# Patient Record
Sex: FEMALE | Race: WHITE | NOT HISPANIC OR LATINO | Employment: FULL TIME | URBAN - METROPOLITAN AREA
[De-identification: names, ages, dates, MRNs, and addresses within clinical notes are randomized per-mention and may not be internally consistent; named-entity substitution may affect disease eponyms.]

---

## 2017-03-10 ENCOUNTER — ALLSCRIPTS OFFICE VISIT (OUTPATIENT)
Dept: OTHER | Facility: OTHER | Age: 22
End: 2017-03-10

## 2017-04-28 ENCOUNTER — ALLSCRIPTS OFFICE VISIT (OUTPATIENT)
Dept: OTHER | Facility: OTHER | Age: 22
End: 2017-04-28

## 2017-08-14 ENCOUNTER — ALLSCRIPTS OFFICE VISIT (OUTPATIENT)
Dept: OTHER | Facility: OTHER | Age: 22
End: 2017-08-14

## 2017-10-31 ENCOUNTER — ALLSCRIPTS OFFICE VISIT (OUTPATIENT)
Dept: OTHER | Facility: OTHER | Age: 22
End: 2017-10-31

## 2017-10-31 LAB
BILIRUB UR QL STRIP: NORMAL
CLARITY UR: NORMAL
COLOR UR: YELLOW
GLUCOSE (HISTORICAL): NORMAL
HGB UR QL STRIP.AUTO: NORMAL
KETONES UR STRIP-MCNC: NORMAL MG/DL
LEUKOCYTE ESTERASE UR QL STRIP: 25
NITRITE UR QL STRIP: NORMAL
PH UR STRIP.AUTO: 5 [PH]
PROT UR STRIP-MCNC: NORMAL MG/DL
SP GR UR STRIP.AUTO: 1.01
UROBILINOGEN UR QL STRIP.AUTO: NORMAL

## 2017-11-02 ENCOUNTER — GENERIC CONVERSION - ENCOUNTER (OUTPATIENT)
Dept: OTHER | Facility: OTHER | Age: 22
End: 2017-11-02

## 2017-11-02 LAB
CULTURE RESULT (HISTORICAL): NORMAL
MISCELLANEOUS LAB TEST RESULT (HISTORICAL): NORMAL

## 2018-01-10 NOTE — RESULT NOTES
Discussion/Summary   Your urine culture is normal  Carol     Verified Results  (1) URINE CULTURE [id number] 12:00AM [place]     Test Name Result Flag Reference   Urine Culture,Comprehensive Final report     Result 1 Comment     No growth in 36 - 48 hours

## 2018-01-13 VITALS
BODY MASS INDEX: 24.73 KG/M2 | TEMPERATURE: 98.5 F | SYSTOLIC BLOOD PRESSURE: 110 MMHG | HEART RATE: 80 BPM | HEIGHT: 69 IN | DIASTOLIC BLOOD PRESSURE: 70 MMHG | WEIGHT: 167 LBS | RESPIRATION RATE: 20 BRPM

## 2018-01-15 NOTE — MISCELLANEOUS
Message  Return to work or school:   Veronica Kam is under my professional care  She was seen in my office on 3/10/17  Excuse on 3/10/17 and 3/11/17 if needed               Signatures   Electronically signed by : Riki Juarez DO; Mar 11 2017 12:03AM EST                       (Author)

## 2018-01-16 NOTE — MISCELLANEOUS
Provider Comments  Provider Comments:   Pt was a no show for a [address], called and left voicemail on cell phone        Signatures   Electronically signed by : Riki Juarez DO; Aug 14 2017 11:18PM EST                       (Author)

## 2018-01-18 NOTE — PROGRESS NOTES
Assessment    1  Acute bacterial conjunctivitis of left eye (372 03) (H10 32)   2  Asthma, mild intermittent (493 90) (J45 20)   3  Depression (311) (F32 9)   4  Tobacco use (305 1) (Z72 0)    Plan  Acute bacterial conjunctivitis of left eye    · Vigamox 0 5 % Ophthalmic Solution; INSTILL 1 DROP INTO AFFECTED EYE(S) 3  TIMES DAILY  SocHx: Tobacco use    · Decreasing the stress in your life may help your condition improve ; Status:Complete;    Done: 26OPQ4608   · Shared Decision Making Aid given; Status:Complete;   Done: 91SNK7140   · You need to quit smoking ; Status:Complete;   Done: 77OWX8072    Discussion/Summary  The patient was counseled regarding risk factor reductions, impressions, risks and benefits of treatment options  Provider Comments  Provider Comments:   asthma stable  mood stable  tob use unchanged  ophtho if no better  fluoroscein staining done, no ulceration or abrasion seen      Chief Complaint  pt c/o left red, swollen, irritated eye for a couple days  ac/cma      History of Present Illness  HPI: 1/4 ppd  depression stable, no seizure hx, still smoking  asthma ok, occas use, and illness  working, [employer] jobs  fdlmp ? no missed period    left eye  was in [place]  no fb exposure  red  irritated  this am crusty and swollen  light sensitive  wears contacts  vision ok      Review of Systems    Cardiovascular: no chest pain  Respiratory: no shortness of breath  Neurological: no dizziness  Active Problems    1  Anxiety (300 00) (F41 9)   2  Asthma, mild intermittent (493 90) (J45 20)   3  BMI 25 0-25 9,adult (V85 21) (Z68 25)   4  Depression (311) (F32 9)   5  Gynecologic Services Contraceptive Management (V25 9)   6  Lumbago (724 2) (M54 5)    Past Medical History    1  History of Accidental Puncture / Laceration During A Procedure (998 2)   2  Acute upper respiratory infection (465 9) (J06 9)   3  History of Acute upper respiratory infection (465 9) (J06 9)   4   History of Acute URI (465 9) (J06 9)   5  History of Acute UTI (599 0) (N39 0)   6  History of Encounter for health supervision or care of other healthy infant or child (V20 1)   (Z76 2)   7  History of Hearing Loss (389 9)   8  History of acute bronchitis (V12 69) (Z87 09)   9  History of acute sinusitis (V12 69) (Z87 09)   10  History of nausea (V12 79) (Z87 898)   11  History of Otitis media, unspecified laterality   12  History of UTI (lower urinary tract infection) (599 0) (N39 0)    Family History  Mother    1  Family history of Anxiety  Father    2  Family history of diabetes mellitus (V18 0) (Z83 3)  Family History Reviewed: The family history was reviewed and updated today  Social History    · Current every day smoker (305 1) (F17 200)  The social history was reviewed and updated today  Surgical History    1  Gynecologic Services Contraceptive Management (V25 9)    Current Meds   1  BuPROPion HCl ER (SR) 100 MG Oral Tablet Extended Release 12 Hour; Take 1 tablet   twice daily; Therapy: 84XMZ1115 to (Evaluate:22Apr2017)  Requested for: 75ZKH1625; Last   Rx:24Oct2016 Ordered   2  ProAir  (90 Base) MCG/ACT Inhalation Aerosol Solution; INHALE 1 PUFF   EVERY 4 HOURS AS NEEDED FOR COUGH AND WHEEZING; Therapy: 92VKT8843 to (Evaluate:23Wni3923)  Requested for: 24Oct2016; Last   Rx:08Ruz6278 Ordered    Allergies    1  Penicillins    Vitals   Recorded: 31WLJ3848 10:42AM   Temperature 97 4 F   Heart Rate 76   Respiration 16   Systolic 331   Diastolic 72   Height 5 ft 9 in   Weight 169 lb    BMI Calculated 24 96   BSA Calculated 1 92     Physical Exam    Constitutional   General appearance: No acute distress, well appearing and well nourished  Eyes   Conjunctiva and lids: Abnormal   Conjunctiva Findings: purulent discharge bilaterally, right hyperemia and left hyperemia, but no watery discharge  Pupils and irises: Equal, round and reactive to light      Ears, Nose, Mouth, and Throat   External inspection of ears and nose: Normal     Otoscopic examination: Tympanic membranes translucent with normal light reflex  Canals patent without erythema  Nasal mucosa, septum, and turbinates: Normal without edema or erythema  Oropharynx: Normal with no erythema, edema, exudate or lesions  Pulmonary   Respiratory effort: No increased work of breathing or signs of respiratory distress  Auscultation of lungs: Clear to auscultation  Cardiovascular   Auscultation of heart: Normal rate and rhythm, normal S1 and S2, without murmurs  Examination of extremities for edema and/or varicosities: Normal     Carotid pulses: Normal     Abdomen   Abdomen: Non-tender, no masses  Lymphatic   Palpation of lymph nodes in neck: No lymphadenopathy  Musculoskeletal   Gait and station: Normal     Digits and nails: Normal without clubbing or cyanosis  Skin   Skin and subcutaneous tissue: Normal without rashes or lesions  Psychiatric   Mood and affect: Normal          Message  Return to work or school:   Caryle Binder is under my professional care  She was seen in my office on 3/10/17  Excuse on 3/10/17 and 3/11/17 if needed               Signatures   Electronically signed by : Farooq Gary DO; Mar 11 2017 12:03AM EST                       (Author)

## 2018-01-22 VITALS
BODY MASS INDEX: 25.03 KG/M2 | SYSTOLIC BLOOD PRESSURE: 110 MMHG | WEIGHT: 169 LBS | RESPIRATION RATE: 16 BRPM | HEIGHT: 69 IN | TEMPERATURE: 97.4 F | HEART RATE: 76 BPM | DIASTOLIC BLOOD PRESSURE: 72 MMHG

## 2018-01-22 VITALS
HEIGHT: 69 IN | DIASTOLIC BLOOD PRESSURE: 80 MMHG | WEIGHT: 170 LBS | TEMPERATURE: 97.9 F | SYSTOLIC BLOOD PRESSURE: 102 MMHG | HEART RATE: 76 BPM | RESPIRATION RATE: 16 BRPM | BODY MASS INDEX: 25.18 KG/M2

## 2018-04-24 ENCOUNTER — OFFICE VISIT (OUTPATIENT)
Dept: FAMILY MEDICINE CLINIC | Facility: CLINIC | Age: 23
End: 2018-04-24
Payer: COMMERCIAL

## 2018-04-24 VITALS
BODY MASS INDEX: 24.62 KG/M2 | DIASTOLIC BLOOD PRESSURE: 74 MMHG | SYSTOLIC BLOOD PRESSURE: 126 MMHG | HEIGHT: 70 IN | TEMPERATURE: 97.5 F | HEART RATE: 72 BPM | WEIGHT: 172 LBS | RESPIRATION RATE: 16 BRPM

## 2018-04-24 DIAGNOSIS — G89.29 CHRONIC BILATERAL LOW BACK PAIN WITHOUT SCIATICA: Primary | ICD-10-CM

## 2018-04-24 DIAGNOSIS — M54.50 CHRONIC BILATERAL LOW BACK PAIN WITHOUT SCIATICA: Primary | ICD-10-CM

## 2018-04-24 DIAGNOSIS — M54.2 CERVICALGIA: ICD-10-CM

## 2018-04-24 PROBLEM — F41.8 DEPRESSION WITH ANXIETY: Status: ACTIVE | Noted: 2017-04-28

## 2018-04-24 PROCEDURE — 3008F BODY MASS INDEX DOCD: CPT | Performed by: FAMILY MEDICINE

## 2018-04-24 PROCEDURE — 99213 OFFICE O/P EST LOW 20 MIN: CPT | Performed by: FAMILY MEDICINE

## 2018-04-24 RX ORDER — NAPROXEN 500 MG/1
500 TABLET ORAL 2 TIMES DAILY PRN
Qty: 40 TABLET | Refills: 1 | Status: SHIPPED | OUTPATIENT
Start: 2018-04-24

## 2018-04-24 NOTE — PROGRESS NOTES
Assessment/Plan:    Problem List Items Addressed This Visit     Cervicalgia     Worsening neck pain         Relevant Medications    naproxen (EC NAPROSYN) 500 MG EC tablet    Other Relevant Orders    Ambulatory referral to Physical Therapy    Chronic bilateral low back pain without sciatica - Primary     Worsening back pain         Relevant Medications    naproxen (EC NAPROSYN) 500 MG EC tablet    Other Relevant Orders    Ambulatory referral to Physical Therapy      She will work on physical therapy while going back to work with restriction  Follow up with orthopedist if she doesn't improve  There are no Patient Instructions on file for this visit  Return if symptoms worsen or fail to improve  Subjective:      Patient ID: Enedelia Keenan is a 20 y o  female  Chief Complaint   Patient presents with    Pain     back pain--lj       She has had back pain for months and then it got worse a few days ago  She has been missing a lot of work  She has seen a chiropractor and had x-rays and treatment there  The manipulation has not helped  She has to do some lifting and walking at work  Her pain is interfering with her job  The pain in her neck will go down her right arm  The following portions of the patient's history were reviewed and updated as appropriate:  past social history    Review of Systems   Musculoskeletal: Positive for back pain and neck pain  Neurological: Negative for weakness  Current Outpatient Prescriptions   Medication Sig Dispense Refill    naproxen (EC NAPROSYN) 500 MG EC tablet Take 1 tablet (500 mg total) by mouth 2 (two) times a day as needed for mild pain Take with food 40 tablet 1     No current facility-administered medications for this visit          Objective:    /74   Pulse 72   Temp 97 5 °F (36 4 °C)   Resp 16   Ht 5' 9 5" (1 765 m)   Wt 78 kg (172 lb)   LMP 04/15/2018   BMI 25 04 kg/m²        Physical Exam   Constitutional: She appears well-developed and well-nourished  HENT:   Head: Normocephalic and atraumatic  Right Ear: External ear normal    Left Ear: External ear normal    Nose: Nose normal    Mouth/Throat: Oropharynx is clear and moist    Cardiovascular: Normal rate, regular rhythm and normal heart sounds  Pulmonary/Chest: Effort normal and breath sounds normal  No respiratory distress  She has no wheezes  Musculoskeletal:   Bilateral cervical paraspinal tenderness, right lumbar tenderness with positive straight leg raising on the right  Stiff gait   Neurological: She has normal reflexes  Nursing note and vitals reviewed               Jodie Blackwell DO

## 2018-07-16 ENCOUNTER — TELEPHONE (OUTPATIENT)
Dept: FAMILY MEDICINE CLINIC | Facility: CLINIC | Age: 23
End: 2018-07-16

## 2019-10-14 ENCOUNTER — TELEPHONE (OUTPATIENT)
Dept: OBGYN CLINIC | Facility: CLINIC | Age: 24
End: 2019-10-14

## 2019-10-15 ENCOUNTER — OFFICE VISIT (OUTPATIENT)
Dept: FAMILY MEDICINE CLINIC | Facility: CLINIC | Age: 24
End: 2019-10-15
Payer: COMMERCIAL

## 2019-10-15 VITALS
OXYGEN SATURATION: 100 % | HEART RATE: 69 BPM | TEMPERATURE: 97.2 F | BODY MASS INDEX: 22.19 KG/M2 | SYSTOLIC BLOOD PRESSURE: 110 MMHG | DIASTOLIC BLOOD PRESSURE: 68 MMHG | HEIGHT: 70 IN | WEIGHT: 155 LBS | RESPIRATION RATE: 18 BRPM

## 2019-10-15 DIAGNOSIS — R10.2 PELVIC PAIN: ICD-10-CM

## 2019-10-15 DIAGNOSIS — L70.9 ACNE, UNSPECIFIED ACNE TYPE: ICD-10-CM

## 2019-10-15 DIAGNOSIS — N92.6 IRREGULAR MENSTRUAL BLEEDING: Primary | ICD-10-CM

## 2019-10-15 PROCEDURE — 99213 OFFICE O/P EST LOW 20 MIN: CPT | Performed by: FAMILY MEDICINE

## 2019-10-15 NOTE — PROGRESS NOTES
Assessment/Plan:     Diagnoses and all orders for this visit:    Irregular menstrual bleeding  Pelvic Pain  Acne  -     Ambulatory referral to Obstetrics / Gynecology; Future  - Unclear etiology at this time  Did discuss birth control with patient, but she does not want OCPs or any hormonal birth control due to side effects and the fact that she is a smoker  Discussed the option of an IUD which she seemed interested in    - Pt is due for pap smear which she states she will get at GYN office    - Discussed to continue with OTC benzoyl peroxide washes for facial acne  If symptoms worsen, we can try alternative options including topical antibiotics  BMI Counseling: Body mass index is 22 24 kg/m²  Discussed with patient's BMI with her  The BMI is normal      Subjective:      Patient ID: Donna Lin is a 30 y o  female  HPI  Celia Morrison is a 29 yo F who presents today with c/o irregular menstrual bleeding, pelvic pain and acne which began in June of 2019  Pt states she had similar issues in the past (had an eating disorder in her teenage years causing her to have no period, followed by irregular bleeding requiring OCPs)  States that OCPs at the time made her feel emotionally horrible and would not like to go back on them  Pt states in June, when symptoms began, she had bleeding in between her regular cycle which was dark in color  The following month she had no period at all  Then this past month she experienced bleeding in between her cycle again  States that the bleeding is different from her usual period bleeding  It is more dark brown-grey in color  She has associated pelvic pain before and during the cycle  The bleeding is not heavier than usual  She is currently having bleeding  Menarche was at age 13  She is sexually active  She is an every day smoker  States that she has also had acne in the past, but has noted it to be worse than usual now    Denies abnormal vaginal discharge, foul odor, urinary symptoms  The following portions of the patient's history were reviewed and updated as appropriate: allergies, current medications, past family history, past medical history, past social history, past surgical history and problem list     Review of Systems   Constitutional: Negative for activity change, appetite change, chills, diaphoresis, fatigue and fever  HENT: Negative  Respiratory: Negative for cough, choking, chest tightness, shortness of breath and wheezing  Cardiovascular: Negative for chest pain, palpitations and leg swelling  Gastrointestinal: Negative for abdominal distention, abdominal pain, constipation, diarrhea, nausea and vomiting  Genitourinary: Positive for menstrual problem, pelvic pain and vaginal bleeding  Negative for difficulty urinating, dyspareunia, dysuria, enuresis, flank pain, frequency and urgency  Musculoskeletal: Negative for arthralgias, back pain, gait problem, joint swelling, myalgias, neck pain and neck stiffness  Skin: Negative  Neurological: Negative for dizziness, tremors, syncope, facial asymmetry, weakness, light-headedness, numbness and headaches  Psychiatric/Behavioral: Negative  Objective:      /68   Pulse 69   Temp (!) 97 2 °F (36 2 °C)   Resp 18   Ht 5' 10" (1 778 m)   Wt 70 3 kg (155 lb)   LMP 09/25/2019 (Approximate)   SpO2 100%   BMI 22 24 kg/m²          Physical Exam   Constitutional: She is oriented to person, place, and time  She appears well-developed and well-nourished  Non-toxic appearance  She does not appear ill  No distress  HENT:   Head: Normocephalic and atraumatic  Cardiovascular: Normal rate, regular rhythm, normal heart sounds and intact distal pulses  Exam reveals no gallop and no friction rub  No murmur heard  Pulmonary/Chest: Effort normal and breath sounds normal  No stridor  No respiratory distress  She has no wheezes  She has no rales  She exhibits no tenderness  Abdominal: Soft   Bowel sounds are normal  She exhibits no distension and no mass  There is no tenderness  There is no rebound and no guarding  Neurological: She is alert and oriented to person, place, and time  Skin: She is not diaphoretic

## 2019-10-24 ENCOUNTER — OFFICE VISIT (OUTPATIENT)
Dept: OBGYN CLINIC | Facility: CLINIC | Age: 24
End: 2019-10-24
Payer: COMMERCIAL

## 2019-10-24 VITALS
WEIGHT: 159 LBS | DIASTOLIC BLOOD PRESSURE: 52 MMHG | BODY MASS INDEX: 22.76 KG/M2 | HEIGHT: 70 IN | SYSTOLIC BLOOD PRESSURE: 96 MMHG

## 2019-10-24 DIAGNOSIS — R30.0 DYSURIA: ICD-10-CM

## 2019-10-24 DIAGNOSIS — N92.6 IRREGULAR MENSTRUAL BLEEDING: Primary | ICD-10-CM

## 2019-10-24 DIAGNOSIS — N94.6 DYSMENORRHEA: ICD-10-CM

## 2019-10-24 PROCEDURE — 99212 OFFICE O/P EST SF 10 MIN: CPT | Performed by: NURSE PRACTITIONER

## 2019-10-24 RX ORDER — NORETHINDRONE ACETATE AND ETHINYL ESTRADIOL 1MG-20(21)
1 KIT ORAL DAILY
Qty: 84 TABLET | Refills: 0 | Status: SHIPPED | OUTPATIENT
Start: 2019-10-24

## 2019-10-24 NOTE — ASSESSMENT & PLAN NOTE
Urine dip in office WNL  Will send out urine culture per patient request   Pt will continue to monitor symptoms and call if worsening  Increase hydration  Drink cranberry juice    Will call with culture results and follow up accordingly

## 2019-10-24 NOTE — PROGRESS NOTES
Assessment/Plan:    Irregular menstrual bleeding  Discussed irregular menses and dysmenorrhea  Rx for TFT's  Options such as 600mg Motrin Q 6 hours vs hormonal contraceptives discussed  Pt is hesitant to try OCPs as had bad mood swings on OCPs in past  Discussed there are many different type of hormonal contraceptives  Reviewed birth control options including OCP, NuvaRing, Patch, Depo provera, Nexplanon  Reviewed when to start  What to do if misses pill  Recommended condom use for STD protection and back up method for at least first month after starting pill or if misses more than 2 pills in the pack  Reviewed common side effects of pill including N/V, HA, Breast Pain, Weight gain, bloating, mood swings  Reassured side effects diminished after first month or two on the pill  Reviewed clotting risk and S/S of PE, DVT, MI, and stroke  Rx for Junel 1/20 Fe sent to pharmacy  RTO 3 months for pill check and annual exam or sooner as needed      Dysuria  Urine dip in office WNL  Will send out urine culture per patient request   Pt will continue to monitor symptoms and call if worsening  Increase hydration  Drink cranberry juice  Will call with culture results and follow up accordingly       Diagnoses and all orders for this visit:    Irregular menstrual bleeding  -     norethindrone-ethinyl estradiol (JUNEL FE 1/20) 1-20 MG-MCG per tablet; Take 1 tablet by mouth daily  -     Ambulatory referral to Obstetrics / Gynecology  -     T4, free  -     TSH, 3rd generation    Dysmenorrhea  -     norethindrone-ethinyl estradiol (JUNEL FE 1/20) 1-20 MG-MCG per tablet; Take 1 tablet by mouth daily    Dysuria          Subjective:      Patient ID: Peet Blake is a 30 y o  female  Pt presents as a new patient to our office with complaints of pelvic pain and irregular menses  Menarche at age 13  Menses are monthly lasting 7-10 days  Moderate flow  Menses are not too painful    Does have some bloating and breast tenderness prior to onset of menses  She also will notice some mild acne and mood swings  Denies any pain with intercourse  Denies any pain outside of menses  In June and September she had bleeding in between her menses  Bleeding was lighter and painful  Dark brown spotting  Last pap smear: 2017 Normal per patient  Denies any abnormal vaginal discharge, itching, or odor  Denies the need for STD testing  Was previously on OCP's, last use was 7 years ago  Pt also complaining of urinary symptoms  Dysuria, frequency, urgency, hesitancy which began earlier this week  She gets UTI's quite frequently    + back pain  Denies any fever or chills  No hematuria  The following portions of the patient's history were reviewed and updated as appropriate: allergies, current medications, past family history, past medical history, past social history, past surgical history and problem list     Review of Systems   All other systems reviewed and are negative  Objective:  BP 96/52 (BP Location: Right arm, Patient Position: Sitting, Cuff Size: Standard)   Ht 5' 10" (1 778 m)   Wt 72 1 kg (159 lb)   LMP 09/25/2019 (Approximate)   BMI 22 81 kg/m²      Physical Exam   Constitutional: She is oriented to person, place, and time  She appears well-developed and well-nourished  HENT:   Head: Normocephalic and atraumatic  Neck: Normal range of motion  Neck supple  No thyromegaly present  Cardiovascular: Normal rate, regular rhythm and normal heart sounds  Pulmonary/Chest: Effort normal and breath sounds normal    Abdominal: Soft  Bowel sounds are normal  She exhibits no distension and no mass  There is no tenderness  There is no rebound and no guarding  Genitourinary: Vagina normal and uterus normal  No labial fusion  There is no rash, tenderness, lesion or injury on the right labia  There is no rash, tenderness, lesion or injury on the left labia   Cervix exhibits no motion tenderness, no discharge and no friability  Right adnexum displays no mass, no tenderness and no fullness  Left adnexum displays no mass, no tenderness and no fullness  Musculoskeletal: Normal range of motion  Neurological: She is alert and oriented to person, place, and time  Skin: Skin is warm and dry  Psychiatric: She has a normal mood and affect   Her behavior is normal  Judgment and thought content normal

## 2019-10-24 NOTE — ASSESSMENT & PLAN NOTE
Discussed irregular menses and dysmenorrhea  Rx for TFT's  Options such as 600mg Motrin Q 6 hours vs hormonal contraceptives discussed  Pt is hesitant to try OCPs as had bad mood swings on OCPs in past  Discussed there are many different type of hormonal contraceptives  Reviewed birth control options including OCP, NuvaRing, Patch, Depo provera, Nexplanon  Reviewed when to start  What to do if misses pill  Recommended condom use for STD protection and back up method for at least first month after starting pill or if misses more than 2 pills in the pack  Reviewed common side effects of pill including N/V, HA, Breast Pain, Weight gain, bloating, mood swings  Reassured side effects diminished after first month or two on the pill  Reviewed clotting risk and S/S of PE, DVT, MI, and stroke    Rx for Junel 1/20 Fe sent to pharmacy  RTO 3 months for pill check and annual exam or sooner as needed

## 2019-11-14 ENCOUNTER — OFFICE VISIT (OUTPATIENT)
Dept: FAMILY MEDICINE CLINIC | Facility: CLINIC | Age: 24
End: 2019-11-14
Payer: COMMERCIAL

## 2019-11-14 VITALS
SYSTOLIC BLOOD PRESSURE: 108 MMHG | OXYGEN SATURATION: 99 % | HEIGHT: 70 IN | BODY MASS INDEX: 21.88 KG/M2 | DIASTOLIC BLOOD PRESSURE: 68 MMHG | HEART RATE: 95 BPM | TEMPERATURE: 101.1 F | WEIGHT: 152.8 LBS | RESPIRATION RATE: 18 BRPM
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
## 2019-11-14 DIAGNOSIS — R68.89 FLU-LIKE SYMPTOMS: Primary | ICD-10-CM

## 2019-11-14 PROCEDURE — 99213 OFFICE O/P EST LOW 20 MIN: CPT | Performed by: NURSE PRACTITIONER

## 2019-11-14 RX ORDER — DOXYCYCLINE HYCLATE 100 MG
100 TABLET ORAL 2 TIMES DAILY
Qty: 28 TABLET | Refills: 0 | Status: SHIPPED | OUTPATIENT
Start: 2019-11-14 | End: 2019-11-28

## 2019-11-14 NOTE — PATIENT INSTRUCTIONS
Take medication with food  It is important that you take the entire course of antibiotics prescribed  May also take a probiotic of your choice to maintain healthy GI joseline  Can take some probiotic and yogurt with the medication  Increase fluid intake, saline nasal rinses, and hot tea with honey and lemon  Cool air humidification can be helpful as well  May take Ibuprofen or Tylenol as needed for pain or fevers  Mucinex D for sinus congestion or Coricidin HBP if you have high blood pressure or a heart condition  Mucinex or Robitussin DM are effective for cough and chest congestion  Supportive care discussed and advised  Advised to RTO for any worsening and no improvement  Follow up for no improvement and worsening of conditions  Patient advised and educated when to see immediate medical care  The maximum daily dose of acetaminophen was discussed with the patient  She was encouraged not to exceed 4,000 mg of acetaminophen during a 24 hour period and was asked to keep in mind that acetaminophen can also be found in many over-the-counter cold medications as well as narcotics that may be given for pain  The patient expresses understanding of these issues and questions were answered

## 2019-11-14 NOTE — PROGRESS NOTES
Assessment/Plan:    1  Flu-like symptoms  -     Resp Virus Panel (RVP) PCR    2  Tick bite, initial encounter  -     doxycycline hyclate (VIBRA-TABS) 100 mg tablet; Take 1 tablet (100 mg total) by mouth 2 (two) times a day for 14 days          BMI Counseling: Body mass index is 21 92 kg/m²  Discussed the patient's BMI with her  Patient Instructions: Take medication with food  It is important that you take the entire course of antibiotics prescribed  May also take a probiotic of your choice to maintain healthy GI joseline  Can take some probiotic and yogurt with the medication  Increase fluid intake, saline nasal rinses, and hot tea with honey and lemon  Cool air humidification can be helpful as well  May take Ibuprofen or Tylenol as needed for pain or fevers  Mucinex D for sinus congestion or Coricidin HBP if you have high blood pressure or a heart condition  Mucinex or Robitussin DM are effective for cough and chest congestion  Supportive care discussed and advised  Advised to RTO for any worsening and no improvement  Follow up for no improvement and worsening of conditions  Patient advised and educated when to see immediate medical care  The maximum daily dose of acetaminophen was discussed with the patient  She was encouraged not to exceed 4,000 mg of acetaminophen during a 24 hour period and was asked to keep in mind that acetaminophen can also be found in many over-the-counter cold medications as well as narcotics that may be given for pain  The patient expresses understanding of these issues and questions were answered  Return if symptoms worsen or fail to improve  Future Appointments   Date Time Provider Vanessa Hernandez   1/24/2020  7:30 AM MARIE Gross [facility]           Subjective:      Patient ID: Nicolas Stoddard is a 30 y o  female      Chief Complaint   Patient presents with    Fever     for the past 3 days [id number]     Headache - Recurrent or Known Dx Migraines    Generalized Body Aches         Vitals:  /68   Pulse 95   Temp (!) 101 1 °F (38 4 °C)   Resp 18   Ht 5' 10" (1 778 m)   Wt 69 3 kg (152 lb 12 8 oz)   LMP 11/05/2019 (Exact Date)   SpO2 99%   BMI 21 92 kg/m²     HPI  Stated that started taking OCP last week and started having abdominal cramping and nausea and vomiting which is resolve now  Also had tick bite last week and on 11/8 and tick was embedded and she removed it and was deer  Has h/o lyme  Stated that started with bodyaches, headaches, and fever 2 days ago  Denies anybody sick around her  The following portions of the patient's history were reviewed and updated as appropriate: allergies, current medications, past family history, past medical history, past social history, past surgical history and problem list       Review of Systems   Constitutional: Positive for fatigue and fever  Negative for chills, diaphoresis and unexpected weight change  HENT: Negative for congestion, dental problem, drooling, ear discharge, ear pain, facial swelling, hearing loss, mouth sores, nosebleeds, postnasal drip, rhinorrhea, sinus pressure, sinus pain, sneezing, sore throat, tinnitus, trouble swallowing and voice change  Respiratory: Negative for cough, chest tightness, shortness of breath and wheezing  Cardiovascular: Negative  Gastrointestinal: Negative for abdominal pain, constipation, diarrhea, nausea and vomiting  Musculoskeletal: Positive for myalgias  Skin: Negative  Neurological: Positive for headaches  Negative for dizziness, weakness and light-headedness  Hematological: Negative  Objective:    Social History     Tobacco Use   Smoking Status Current Every Day Smoker   Smokeless Tobacco Current User       Allergies:    Allergies   Allergen Reactions    Penicillins Rash     Reaction Date: 01Jul2005;          Current Outpatient Medications   Medication Sig Dispense Refill    norethindrone-ethinyl estradiol (JUNEL FE 1/20) 1-20 MG-MCG per tablet Take 1 tablet by mouth daily 84 tablet 0    doxycycline hyclate (VIBRA-TABS) 100 mg tablet Take 1 tablet (100 mg total) by mouth 2 (two) times a day for 14 days 28 tablet 0    naproxen (EC NAPROSYN) 500 MG EC tablet Take 1 tablet (500 mg total) by mouth 2 (two) times a day as needed for mild pain Take with food (Patient not taking: Reported on 10/15/2019) 40 tablet 1     No current facility-administered medications for this visit  Physical Exam   Constitutional: She is oriented to person, place, and time  She appears well-developed and well-nourished  HENT:   Head: Normocephalic  Right Ear: Tympanic membrane, external ear and ear canal normal    Left Ear: Tympanic membrane, external ear and ear canal normal    Nose: Nose normal  Right sinus exhibits no maxillary sinus tenderness and no frontal sinus tenderness  Left sinus exhibits no maxillary sinus tenderness and no frontal sinus tenderness  Mouth/Throat: Oropharynx is clear and moist and mucous membranes are normal    Neck: Neck supple  Cardiovascular: Normal rate, regular rhythm and normal heart sounds  Pulmonary/Chest: Effort normal and breath sounds normal    Abdominal: Normal appearance and bowel sounds are normal  There is no hepatosplenomegaly  There is no tenderness  There is no rebound  Musculoskeletal: Normal range of motion  Lymphadenopathy:        Right cervical: No superficial cervical and no posterior cervical adenopathy present  Left cervical: No superficial cervical and no posterior cervical adenopathy present  Neurological: She is alert and oriented to person, place, and time  Skin: Skin is warm and dry  Psychiatric: She has a normal mood and affect  Her behavior is normal  Judgment and thought content normal    Vitals reviewed                    MARIE Washington

## 2019-11-17 LAB
FLUAV RNA SPEC QL NAA+PROBE: NEGATIVE
FLUBV RNA SPEC QL NAA+PROBE: NEGATIVE
HADV DNA SPEC QL NAA+PROBE: NEGATIVE
HMPV RNA SPEC QL NAA+PROBE: NEGATIVE
HPIV1 RNA SPEC QL NAA+PROBE: NEGATIVE
HPIV2 RNA SPEC QL NAA+PROBE: NEGATIVE
HPIV3 RNA SPEC QL NAA+PROBE: NEGATIVE
RHINOVIRUS RNA SPEC QL NAA+PROBE: NEGATIVE
RSV A RNA SPEC QL NAA+PROBE: NEGATIVE
RSV B RNA SPEC QL NAA+PROBE: NEGATIVE

## 2019-12-03 ENCOUNTER — OFFICE VISIT (OUTPATIENT)
Dept: FAMILY MEDICINE CLINIC | Facility: CLINIC | Age: 24
End: 2019-12-03
Payer: COMMERCIAL

## 2019-12-03 VITALS
HEIGHT: 70 IN | DIASTOLIC BLOOD PRESSURE: 60 MMHG | HEART RATE: 72 BPM | SYSTOLIC BLOOD PRESSURE: 100 MMHG | BODY MASS INDEX: 21.9 KG/M2 | TEMPERATURE: 98.5 F | RESPIRATION RATE: 16 BRPM | WEIGHT: 153 LBS

## 2019-12-03 DIAGNOSIS — B34.9 VIRAL ILLNESS: ICD-10-CM
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
## 2019-12-03 PROCEDURE — 3008F BODY MASS INDEX DOCD: CPT | Performed by: NURSE PRACTITIONER

## 2019-12-03 PROCEDURE — 99213 OFFICE O/P EST LOW 20 MIN: CPT | Performed by: NURSE PRACTITIONER

## 2019-12-03 NOTE — PROGRESS NOTES
Assessment/Plan:    1  Tick bite, initial encounter  -     Lyme Antibody Profile with reflex to WB; Future  -     Babesia microti antibody, IgG & Igm; Future  -     Comprehensive metabolic panel; Future  -     CBC and Platelet; Future    2  Viral illness  -     Comprehensive metabolic panel; Future  -     CBC and Platelet; Future          BMI Counseling: Body mass index is 21 95 kg/m²  Discussed the patient's BMI with her  Patient Instructions:  Supportive care discussed and advised  Advised to RTO for any worsening and no improvement  Follow up for no improvement and worsening of conditions  Patient advised and educated when to see immediate medical care  Return if symptoms worsen or fail to improve  Future Appointments   Date Time Provider Vanessa Hernandez   1/24/2020  7:30 AM MARIE Gross [facility]           Subjective:      Patient ID: Gopal Villanueva is a 30 y o  female  Chief Complaint   Patient presents with    Follow-up     lyme disease--lj         Vitals:  /60   Pulse 72   Temp 98 5 °F (36 9 °C)   Resp 16   Ht 5' 10" (1 778 m)   Wt 69 4 kg (153 lb)   LMP 11/05/2019 (Exact Date)   BMI 21 95 kg/m²     HPI  Patient was seen in office on 11/14/19 for flu like symptoms and had the tick bite and treated with doxy for 14 days which she finished  Stated that now again started with chills, sweats, body aches and fatigue  Getting light headed occasionally  Stated that wanted to get checked for lyme  Denies any fever, chills, sob and chest pain  The following portions of the patient's history were reviewed and updated as appropriate: allergies, current medications, past family history, past medical history, past social history, past surgical history and problem list       Review of Systems   Constitutional: Positive for chills and diaphoresis  Negative for fatigue, fever and unexpected weight change     HENT: Negative for congestion, dental problem, drooling, ear discharge, ear pain, facial swelling, hearing loss, mouth sores, nosebleeds, postnasal drip, rhinorrhea, sinus pressure, sinus pain, sneezing, sore throat, tinnitus, trouble swallowing and voice change  Respiratory: Negative for cough, chest tightness, shortness of breath and wheezing  Cardiovascular: Negative  Gastrointestinal: Negative for abdominal pain, constipation, diarrhea, nausea and vomiting  Musculoskeletal: Positive for myalgias  Skin: Negative  Neurological: Positive for light-headedness  Negative for dizziness, weakness and headaches  Hematological: Negative  Objective:    Social History     Tobacco Use   Smoking Status Current Every Day Smoker   Smokeless Tobacco Current User       Allergies: Allergies   Allergen Reactions    Penicillins Rash     Reaction Date: 01Jul2005;          Current Outpatient Medications   Medication Sig Dispense Refill    naproxen (EC NAPROSYN) 500 MG EC tablet Take 1 tablet (500 mg total) by mouth 2 (two) times a day as needed for mild pain Take with food 40 tablet 1    norethindrone-ethinyl estradiol (JUNEL FE 1/20) 1-20 MG-MCG per tablet Take 1 tablet by mouth daily 84 tablet 0     No current facility-administered medications for this visit  Physical Exam   Constitutional: She is oriented to person, place, and time  She appears well-developed and well-nourished  HENT:   Head: Normocephalic  Right Ear: Tympanic membrane, external ear and ear canal normal    Left Ear: Tympanic membrane, external ear and ear canal normal    Nose: Nose normal  Right sinus exhibits no maxillary sinus tenderness and no frontal sinus tenderness  Left sinus exhibits no maxillary sinus tenderness and no frontal sinus tenderness  Mouth/Throat: Oropharynx is clear and moist and mucous membranes are normal    Neck: Neck supple  Cardiovascular: Normal rate, regular rhythm and normal heart sounds     Pulmonary/Chest: Effort normal and breath sounds normal    Abdominal: Normal appearance and bowel sounds are normal  There is no hepatosplenomegaly  There is no tenderness  There is no rebound  Musculoskeletal: Normal range of motion  Lymphadenopathy:        Right cervical: No superficial cervical and no posterior cervical adenopathy present  Left cervical: No superficial cervical and no posterior cervical adenopathy present  Neurological: She is alert and oriented to person, place, and time  Skin: Skin is warm and dry  No rash noted  Psychiatric: She has a normal mood and affect  Her behavior is normal  Judgment and thought content normal    Vitals reviewed                    MARIE Hill

## 2019-12-03 NOTE — PATIENT INSTRUCTIONS
Viral Syndrome   AMBULATORY CARE:   Viral syndrome  is a term used for general symptoms of a viral infection that has no clear cause  Viruses are spread easily from person to person through the air and on shared items  Common symptoms include the following:   · Fever and chills    · A runny or stuffy nose     · Cough, sore throat, or hoarseness     · Headache, or pain and pressure around your eyes     · Muscle aches and joint pain     · Shortness of breath or wheezing     · Abdominal pain, cramps, and diarrhea     · Nausea, vomiting, or loss of appetite  Call 911 for the following:   · You have a seizure  · You cannot be woken  · You have chest pain or trouble breathing  Seek care immediately if:   · You have a stiff neck, a bad headache, and sensitivity to light  · You feel weak, dizzy, or confused  · You stop urinating or urinate a lot less than normal      · You cough up blood or thick, yellow or green, mucus  · You have severe abdominal pain or your abdomen is larger than usual   Contact your healthcare provider if:   · Your symptoms do not get better with treatment, or get worse, after 3 days  · You have a rash or ear pain  · You have burning when you urinate  · You have questions or concerns about your condition or care  Treatment for viral syndrome  may include medicines to manage your symptoms  You may  need any of the following:  · Acetaminophen  decreases pain and fever  It is available without a doctor's order  Ask how much medicine to take and how often to take it  Follow directions  Acetaminophen can cause liver damage if not taken correctly  · NSAIDs , such as ibuprofen, help decrease swelling, pain, and fever  NSAIDs can cause stomach bleeding or kidney problems in certain people  If you take blood thinner medicine, always ask your healthcare provider if NSAIDs are safe for you  Always read the medicine label and follow directions      · Cold medicine  helps decrease swelling, control a cough, and relieve chest or nasal congestion  · Saline nasal spray  helps decrease nasal congestion  · Take your medicine as directed  Contact your healthcare provider if you think your medicine is not helping or if you have side effects  Tell him of her if you are allergic to any medicine  Keep a list of the medicines, vitamins, and herbs you take  Include the amounts, and when and why you take them  Bring the list or the pill bottles to follow-up visits  Carry your medicine list with you in case of an emergency  Manage your symptoms:   · Drink liquids as directed  to prevent dehydration  Ask how much liquid to drink each day and which liquids are best for you  Ask if you should drink an oral rehydration solution (ORS)  An ORS has the right amounts of water, salts, and sugar you need to replace body fluids  This may help prevent dehydration caused by vomiting or diarrhea  Do not drink liquids with caffeine  Drinks with caffeine can make dehydration worse  · Get plenty of rest  to help your body heal  Take naps throughout the day  Ask your healthcare provider when you can return to work and your normal activities  · Use a cool mist humidifier  to help you breathe easier if you have nasal or chest congestion  Ask your healthcare provider how to use a cool mist humidifier  · Eat honey or use cough drops  to help decrease throat discomfort  Ask your healthcare provider how much honey you should eat each day  Cough drops are available without a doctor's order  Follow directions for taking cough drops  · Do not smoke and stay away from others who smoke  Nicotine and other chemicals in cigarettes and cigars can cause lung damage  Smoking can also delay healing  Ask your healthcare provider for information if you currently smoke and need help to quit  E-cigarettes or smokeless tobacco still contain nicotine   Talk to your healthcare provider before you use these products  · Wash your hands frequently  to prevent the spread of germs to others  Use soap and water  Use gel hand  when soap and water are not available  Wash your hands after you use the bathroom, cough, or sneeze  Wash your hands before you prepare or eat food  Follow up with your healthcare provider as directed:  Write down your questions so you remember to ask them during your visits  © 2017 [facility] Information is for End User's use only and may not be sold, redistributed or otherwise used for commercial purposes  All illustrations and images included in CareNotes® are the copyrighted property of A D A M [employer] Inc  or [employer]  The above information is an  only  It is not intended as medical advice for individual conditions or treatments  Talk to your doctor, nurse or pharmacist before following any medical regimen to see if it is safe and effective for you

## 2019-12-24 LAB
ALBUMIN SERPL-MCNC: 5.2 G/DL (ref 3.5–5.5)
ALBUMIN/GLOB SERPL: 2.2 {RATIO} (ref 1.2–2.2)
ALP SERPL-CCNC: 58 IU/L (ref 39–117)
ALT SERPL-CCNC: 16 IU/L (ref 0–32)
AST SERPL-CCNC: 26 IU/L (ref 0–40)
B BURGDOR IGG+IGM SER-ACNC: <0.91 ISR (ref 0–0.9)
B BURGDOR IGM SER IA-ACNC: <0.8 INDEX (ref 0–0.79)
B MICROTI IGG TITR SER: NORMAL {TITER}
B MICROTI IGM TITR SER: NORMAL {TITER}
BASOPHILS # BLD AUTO: 0 X10E3/UL (ref 0–0.2)
BASOPHILS NFR BLD AUTO: 0 %
BILIRUB SERPL-MCNC: 0.6 MG/DL (ref 0–1.2)
BUN SERPL-MCNC: 8 MG/DL (ref 6–20)
BUN/CREAT SERPL: 11 (ref 9–23)
CALCIUM SERPL-MCNC: 9.9 MG/DL (ref 8.7–10.2)
CHLORIDE SERPL-SCNC: 103 MMOL/L (ref 96–106)
CO2 SERPL-SCNC: 20 MMOL/L (ref 20–29)
CREAT SERPL-MCNC: 0.75 MG/DL (ref 0.57–1)
EOSINOPHIL # BLD AUTO: 0.2 X10E3/UL (ref 0–0.4)
EOSINOPHIL NFR BLD AUTO: 2 %
ERYTHROCYTE [DISTWIDTH] IN BLOOD BY AUTOMATED COUNT: 12.7 % (ref 12.3–15.4)
GLOBULIN SER-MCNC: 2.4 G/DL (ref 1.5–4.5)
GLUCOSE SERPL-MCNC: 87 MG/DL (ref 65–99)
HCT VFR BLD AUTO: 38.6 % (ref 34–46.6)
HGB BLD-MCNC: 13.5 G/DL (ref 11.1–15.9)
IMM GRANULOCYTES # BLD: 0 X10E3/UL (ref 0–0.1)
IMM GRANULOCYTES NFR BLD: 0 %
LYMPHOCYTES # BLD AUTO: 1.7 X10E3/UL (ref 0.7–3.1)
LYMPHOCYTES NFR BLD AUTO: 27 %
MCH RBC QN AUTO: 31.7 PG (ref 26.6–33)
MCHC RBC AUTO-ENTMCNC: 35 G/DL (ref 31.5–35.7)
MCV RBC AUTO: 91 FL (ref 79–97)
MONOCYTES # BLD AUTO: 0.5 X10E3/UL (ref 0.1–0.9)
MONOCYTES NFR BLD AUTO: 8 %
NEUTROPHILS # BLD AUTO: 4 X10E3/UL (ref 1.4–7)
NEUTROPHILS NFR BLD AUTO: 63 %
PLATELET # BLD AUTO: 232 X10E3/UL (ref 150–450)
POTASSIUM SERPL-SCNC: 4.8 MMOL/L (ref 3.5–5.2)
PROT SERPL-MCNC: 7.6 G/DL (ref 6–8.5)
RBC # BLD AUTO: 4.26 X10E6/UL (ref 3.77–5.28)
SL AMB EGFR AFRICAN AMERICAN: 127 ML/MIN/1.73
SL AMB EGFR NON AFRICAN AMERICAN: 110 ML/MIN/1.73
SODIUM SERPL-SCNC: 139 MMOL/L (ref 134–144)
T4 FREE SERPL-MCNC: 1.38 NG/DL (ref 0.82–1.77)
TSH SERPL DL<=0.005 MIU/L-ACNC: 0.74 UIU/ML (ref 0.45–4.5)
WBC # BLD AUTO: 6.4 X10E3/UL (ref 3.4–10.8)

## 2023-03-01 NOTE — TELEPHONE ENCOUNTER
Pt's mother called in, pt having pain/spotting between periods, feel it may be endometriosis  Offered her first available appointment for a new patient, pts' mother stated she is in quite a bit of pain, I then suggested ER  Pt's mother agrees, will go to ER and call us for follow up appt, or any further appts  Received fax from [facility] requires PA  Key [id number]    PA initiated on CMM  Additional Information Required  The [employer] Prior Authorization Team is unable to review this request for prior authorization as there is a clinical denial on file with duplicate information, Case ID [id number]  Please review the decision letter sent to your office on 3/3/2021 for denial reason and next steps      Chart reviewed:  Pt tried sumatriptan    Will call PA dept tmrw at [phone]

## 2024-04-05 ENCOUNTER — OFFICE VISIT (OUTPATIENT)
Dept: FAMILY MEDICINE CLINIC | Facility: CLINIC | Age: 29
End: 2024-04-05
Payer: COMMERCIAL

## 2024-04-05 VITALS
RESPIRATION RATE: 16 BRPM | WEIGHT: 182.8 LBS | HEIGHT: 69 IN | BODY MASS INDEX: 27.08 KG/M2 | DIASTOLIC BLOOD PRESSURE: 68 MMHG | TEMPERATURE: 98.9 F | HEART RATE: 64 BPM | SYSTOLIC BLOOD PRESSURE: 108 MMHG

## 2024-04-05 DIAGNOSIS — F32.A MILD DEPRESSION: Primary | ICD-10-CM

## 2024-04-05 DIAGNOSIS — F41.1 GAD (GENERALIZED ANXIETY DISORDER): ICD-10-CM

## 2024-04-05 PROCEDURE — 99203 OFFICE O/P NEW LOW 30 MIN: CPT | Performed by: NURSE PRACTITIONER

## 2024-04-05 RX ORDER — ESCITALOPRAM OXALATE 5 MG/1
5 TABLET ORAL DAILY
COMMUNITY
Start: 2024-02-06 | End: 2024-04-05

## 2024-04-05 RX ORDER — ESCITALOPRAM OXALATE 10 MG/1
10 TABLET ORAL DAILY
Qty: 90 TABLET | Refills: 0 | Status: SHIPPED | OUTPATIENT
Start: 2024-04-05 | End: 2024-07-04

## 2024-04-05 NOTE — PATIENT INSTRUCTIONS
Escitalopram (By mouth)   Escitalopram ([id number]-oh-pram)  Treats depression and generalized anxiety disorder (GIO).   Brand Name(s): Lexapro   There may be other brand names for this medicine.  When This Medicine Should Not Be Used:   This medicine is not right for everyone. Do not use it if you had an allergic reaction to escitalopram or citalopram.  How to Use This Medicine:   Liquid, Tablet  Take this medicine as directed. You may need to take it for a month or more before you feel better. Your dose may need to be changed to find out what works best for you.  Measure the oral liquid medicine with a marked measuring spoon, oral syringe, or medicine cup.  This medicine should come with a Medication Guide. Ask your pharmacist for a copy if you do not have one.  Missed dose: Take a dose as soon as you remember. If it is almost time for your next dose, wait until then and take a regular dose. Do not take extra medicine to make up for a missed dose.  Store the medicine in a closed container at room temperature, away from heat, moisture, and direct light.  Drugs and Foods to Avoid:   Ask your doctor or pharmacist before using any other medicine, including over-the-counter medicines, vitamins, and herbal products.  Do not use this medicine together with pimozide. Do not use this medicine and an MAO inhibitor (MAOI) within 14 days of each other.  Some medicines can affect how escitalopram works. Tell your doctor if you are using the following:   Buspirone, carbamazepine, cimetidine, desipramine, fentanyl, lithium, Sudha's wort, tramadol  Amphetamines  Blood thinner (including warfarin)  Diuretic (water pill)  NSAID pain or arthritis medicine (including aspirin, ibuprofen, naproxen)  Triptan medicine to treat migraine headaches (including sumatriptan)  Tryptophan supplements  Tell your doctor if you use anything else that makes you sleepy. Some examples are allergy medicine, narcotic pain medicine, and alcohol.  Do  not drink alcohol while you are using this medicine.  Warnings While Using This Medicine:   Tell your doctor if you are pregnant or breastfeeding, or if you have kidney disease, liver disease, bleeding problems, glaucoma, heart disease, or a history of seizures.  For some children, teenagers, and young adults, this medicine may increase mental or emotional problems. This may lead to thoughts of suicide and violence. Talk with your doctor right away if you have any thoughts or behavior changes that concern you. Tell your doctor if you or anyone in your family has a history of bipolar disorder or suicide attempts.  This medicine may cause the following problems:   Serotonin syndrome (may be life-threatening when used with certain other medicines)  Increased risk of bleeding problems  Sexual problems  This medicine may make you dizzy or drowsy. Do not drive or do anything that could be dangerous until you know how this medicine affects you.  Your doctor may want to monitor your child's weight and height, because this medicine may cause decreased appetite and weight loss in children.  Do not stop using this medicine suddenly. Your doctor will need to slowly decrease your dose before you stop it completely.  Your doctor will do lab tests at regular visits to check on the effects of this medicine. Keep all appointments.  Keep all medicine out of the reach of children. Never share your medicine with anyone.  Possible Side Effects While Using This Medicine:   Call your doctor right away if you notice any of these side effects:  Allergic reaction: Itching or hives, swelling in your face or hands, swelling or tingling in your mouth or throat, chest tightness, trouble breathing  Anxiety, restlessness, fever, sweating, muscle spasms, nausea, vomiting, diarrhea, seeing or hearing things that are not there  Confusion, weakness, and muscle twitching  Eye pain, vision changes, seeing halos around lights  Fast, pounding, or uneven  heartbeat  Feeling more excited or energetic than usual, racing thoughts, trouble sleeping  Loss in sexual ability, desire, drive, or performance, delayed or inability to have an orgasm, inability to have or keep an erection  Painful, prolonged erection of your penis  Seizures  Thoughts of hurting yourself or others, unusual behavior  Unusual bleeding or bruising  If you notice these less serious side effects, talk with your doctor:   Dizziness, drowsiness, or sleepiness  Dry mouth  Headache  Nausea, constipation, diarrhea  If you notice other side effects that you think are caused by this medicine, tell your doctor.   Call your doctor for medical advice about side effects. You may report side effects to FDA at 3-105-FDA-3121  © Copyright [employer] 2023 Information is for End User's use only and may not be sold, redistributed or otherwise used for commercial purposes.  The above information is an  only. It is not intended as medical advice for individual conditions or treatments. Talk to your doctor, nurse or pharmacist before following any medical regimen to see if it is safe and effective for you.

## 2024-04-05 NOTE — PROGRESS NOTES
"Assessment/Plan:    1. Mild depression  -     escitalopram (Lexapro) 10 mg tablet; Take 1 tablet (10 mg total) by mouth daily    2. GIO (generalized anxiety disorder)  -     escitalopram (Lexapro) 10 mg tablet; Take 1 tablet (10 mg total) by mouth daily          BMI Counseling: Body mass index is 27.07 kg/m². Discussed the patient's BMI with her. The BMI is above normal. Nutrition recommendations include reducing portion sizes, decreasing overall calorie intake, 3-5 servings of fruits/vegetables daily, reducing fast food intake, consuming healthier snacks, decreasing soda and/or juice intake, moderation in carbohydrate intake, increasing intake of lean protein, reducing intake of saturated fat and trans fat, and reducing intake of cholesterol.    Patient Instructions:  Supportive care discussed and advised.  Advised to RTO for any worsening and no improvement.   Follow up for no improvement and worsening of conditions.  Patient advised and educated when to see immediate medical care.    Return in about 1 month (around 5/5/2024) for Annual physical.      No future appointments.          Subjective:      Patient ID: Juan Nunn is a 29 y.o. female.    Chief Complaint   Patient presents with   • Establish Care     Needs refill   YC         Vitals:  /68   Pulse 64   Temp 98.9 °F (37.2 °C) (Tympanic)   Resp 16   Ht 5' 8.9\" (1.75 m)   Wt 82.9 kg (182 lb 12.8 oz)   LMP 12/05/2023 (Approximate) Comment: pt is on new birth control, states she does not really get her period  BMI 27.07 kg/m²     HPI  New to practice.  Personal and family medical history reviewed.   Patient stated that taking lexapro 5 mg from 2 years for anxiety and depression and not able to go back to therapist and they will not prescribe medication anymore. Denies any issues with lexapro at this time but would like to increase dosage as thinks current dosage not helping with symptoms.  Denies chest pain, sob, and dizziness and " palpitations          PHQ-2/9 Depression Screening    Little interest or pleasure in doing things: 1 - several days  Feeling down, depressed, or hopeless: 1 - several days  Trouble falling or staying asleep, or sleeping too much: 1 - several days  Feeling tired or having little energy: 1 - several days  Poor appetite or overeatin - several days  Feeling bad about yourself - or that you are a failure or have let yourself or your family down: 1 - several days  Trouble concentrating on things, such as reading the newspaper or watching television: 1 - several days  Moving or speaking so slowly that other people could have noticed. Or the opposite - being so fidgety or restless that you have been moving around a lot more than usual: 0 - not at all  Thoughts that you would be better off dead, or of hurting yourself in some way: 0 - not at all  PHQ-9 Score: 7  PHQ-9 Interpretation: Mild depression       GIO-7 Flowsheet Screening    Flowsheet Row Most Recent Value   Over the last 2 weeks, how often have you been bothered by any of the following problems?    Feeling nervous, anxious, or on edge 1   Not being able to stop or control worrying 1   Worrying too much about different things 1   Trouble relaxing 1   Being so restless that it is hard to sit still 0   Becoming easily annoyed or irritable 0   Feeling afraid as if something awful might happen 1   GIO-7 Total Score 5              The following portions of the patient's history were reviewed and updated as appropriate: allergies, current medications, past family history, past medical history, past social history, past surgical history and problem list.      Review of Systems   HENT: Negative.     Respiratory: Negative.     Cardiovascular: Negative.    Gastrointestinal: Negative.    Psychiatric/Behavioral:  Positive for decreased concentration and sleep disturbance. The patient is nervous/anxious.          Objective:    Social History     Tobacco Use   Smoking Status  Every Day   • Passive exposure: Never   Smokeless Tobacco Current       Allergies:   Allergies   Allergen Reactions   • Penicillins Rash     Reaction Date: 01Jul2005;          Current Outpatient Medications   Medication Sig Dispense Refill   • escitalopram (Lexapro) 10 mg tablet Take 1 tablet (10 mg total) by mouth daily 90 tablet 0   • Norethindrone (KATHERINE PO) Take by mouth Birth control; pt is unsure of mg       No current facility-administered medications for this visit.          Physical Exam  Constitutional:       Appearance: Normal appearance.   HENT:      Head: Normocephalic and atraumatic.      Nose: Nose normal.   Eyes:      Conjunctiva/sclera: Conjunctivae normal.   Cardiovascular:      Rate and Rhythm: Normal rate and regular rhythm.      Pulses: Normal pulses.      Heart sounds: Normal heart sounds.   Pulmonary:      Effort: Pulmonary effort is normal.      Breath sounds: Normal breath sounds.   Skin:     General: Skin is warm and dry.      Findings: No rash.   Neurological:      Mental Status: She is alert and oriented to person, place, and time.   Psychiatric:         Mood and Affect: Mood normal.         Behavior: Behavior normal.         Thought Content: Thought content normal.         Judgment: Judgment normal.                     MARIE Velazco

## 2024-04-10 ENCOUNTER — TELEPHONE (OUTPATIENT)
Age: 29
End: 2024-04-10

## 2024-04-10 ENCOUNTER — OFFICE VISIT (OUTPATIENT)
Dept: FAMILY MEDICINE CLINIC | Facility: CLINIC | Age: 29
End: 2024-04-10
Payer: COMMERCIAL

## 2024-04-10 VITALS
DIASTOLIC BLOOD PRESSURE: 60 MMHG | HEIGHT: 69 IN | SYSTOLIC BLOOD PRESSURE: 122 MMHG | RESPIRATION RATE: 16 BRPM | TEMPERATURE: 98.8 F | HEART RATE: 64 BPM | BODY MASS INDEX: 27.11 KG/M2 | WEIGHT: 183 LBS

## 2024-04-10 DIAGNOSIS — F41.1 GAD (GENERALIZED ANXIETY DISORDER): Primary | ICD-10-CM

## 2024-04-10 PROCEDURE — 99213 OFFICE O/P EST LOW 20 MIN: CPT | Performed by: NURSE PRACTITIONER

## 2024-04-10 RX ORDER — HYDROXYZINE HYDROCHLORIDE 25 MG/1
25 TABLET, FILM COATED ORAL 2 TIMES DAILY PRN
Qty: 60 TABLET | Refills: 0 | Status: SHIPPED | OUTPATIENT
Start: 2024-04-10 | End: 2024-05-10

## 2024-04-10 NOTE — PROGRESS NOTES
"Assessment/Plan:    1. GIO (generalized anxiety disorder)  Assessment & Plan:  Will continue with lexapro 10 mg as just increased about couple of days ago and will take hydroxyzine as needed and will follow back for any worsening of symptoms    Orders:  -     hydrOXYzine HCL (ATARAX) 25 mg tablet; Take 1 tablet (25 mg total) by mouth 2 (two) times a day as needed for anxiety              Patient Instructions:  Supportive care discussed and advised.  Advised to RTO for any worsening and no improvement.   Follow up for no improvement and worsening of conditions.  Patient advised and educated when to see immediate medical care.    Return if symptoms worsen or fail to improve.      Future Appointments   Date Time Provider Department Center   6/10/2024  3:45 PM DO ÁLVARO Hunt [facility]           Subjective:      Patient ID: Juan Nunn is a 29 y.o. female.    Chief Complaint   Patient presents with   • Anxiety     Patient states its become hard to function with her anxiety. Waking up with sweats, shaking, tightness in her chest, and stomach pain. Going on for over a week  YC         Vitals:  /60   Pulse 64   Temp 98.8 °F (37.1 °C) (Tympanic)   Resp 16   Ht 5' 8.9\" (1.75 m)   Wt 83 kg (183 lb)   LMP 12/05/2023 (Approximate) Comment: pt is on new birth control, states she does not really get her period  BMI 27.10 kg/m²     Patient is here to follow up on anxiety. Stated that started taking increased dose of lexapro of 10 mg on 4/5/24 and stated that her anxiety is bad as feeling Shaky and sweaty with chest tightness at times. Stated that feeling something bad going to happen. Denies any sob.         Anxiety  Symptoms include decreased concentration and nervous/anxious behavior. Patient reports no chest pain, palpitations or shortness of breath.           The following portions of the patient's history were reviewed and updated as appropriate: allergies, current medications, past family history, " past medical history, past social history, past surgical history and problem list.      Review of Systems   HENT: Negative.     Respiratory:  Positive for chest tightness. Negative for apnea, cough, choking, shortness of breath, wheezing and stridor.    Cardiovascular:  Negative for chest pain, palpitations and leg swelling.   Gastrointestinal: Negative.    Psychiatric/Behavioral:  Positive for decreased concentration. The patient is nervous/anxious.          Objective:    Social History     Tobacco Use   Smoking Status Former   • Current packs/day: 0.00   • Average packs/day: 1 pack/day for 7.0 years (7.0 ttl pk-yrs)   • Types: Cigarettes   • Start date:    • Quit date:    • Years since quittin.2   • Passive exposure: Never   Smokeless Tobacco Current       Allergies:   Allergies   Allergen Reactions   • Penicillins Rash     Reaction Date: 2005;          Current Outpatient Medications   Medication Sig Dispense Refill   • escitalopram (Lexapro) 10 mg tablet Take 1 tablet (10 mg total) by mouth daily 90 tablet 0   • hydrOXYzine HCL (ATARAX) 25 mg tablet Take 1 tablet (25 mg total) by mouth 2 (two) times a day as needed for anxiety 60 tablet 0   • Norethindrone (KATHERINE PO) Take by mouth Birth control; pt is unsure of mg       No current facility-administered medications for this visit.          Physical Exam  Constitutional:       Appearance: Normal appearance.   HENT:      Head: Normocephalic and atraumatic.      Nose: Nose normal.   Eyes:      Conjunctiva/sclera: Conjunctivae normal.   Cardiovascular:      Rate and Rhythm: Normal rate and regular rhythm.      Pulses: Normal pulses.      Heart sounds: Normal heart sounds.   Pulmonary:      Effort: Pulmonary effort is normal.      Breath sounds: Normal breath sounds.   Skin:     General: Skin is warm and dry.      Findings: No rash.   Neurological:      Mental Status: She is alert and oriented to person, place, and time.   Psychiatric:         Mood and  Affect: Mood is anxious.         Behavior: Behavior normal.         Thought Content: Thought content normal.         Judgment: Judgment normal.                     MARIE Velazco

## 2024-04-10 NOTE — ASSESSMENT & PLAN NOTE
Will continue with lexapro 10 mg as just increased about couple of days ago and will take hydroxyzine as needed and will follow back for any worsening of symptoms

## 2024-04-10 NOTE — TELEPHONE ENCOUNTER
Since increasing the dosage of Lexapro her anxiety has gotten worse. She is shaking, sweating, has a stomach ache and increased /78. Please advise.

## 2024-04-10 NOTE — PATIENT INSTRUCTIONS
Hydroxyzine (By mouth)   Hydroxyzine ([id number])  Treats anxiety, nausea, vomiting, allergies, skin rash, hives, and itching. May also be used with anesthesia for medical procedures.   Brand Name(s): Vistaril   There may be other brand names for this medicine.  When This Medicine Should Not Be Used:   This medicine is not right for everyone. Do not use it if you had an allergic reaction to hydroxyzine, cetirizine, or levocetirizine. Do not use it during the early part of pregnancy or if you have heart rhythm problems (including prolonged QT interval).  How to Use This Medicine:   Capsule, Liquid, Tablet  Your doctor will tell you how much medicine to use. Do not use more than directed.  Oral liquid: Measure the oral liquid medicine with a marked measuring spoon, oral syringe, or medicine cup. Shake well just before use.  Missed dose: Take a dose as soon as you remember. If it is almost time for your next dose, wait until then and take a regular dose. Do not take extra medicine to make up for a missed dose.  Store the medicine in a closed container at room temperature, away from heat, moisture, and direct light.  Drugs and Foods to Avoid:   Ask your doctor or pharmacist before using any other medicine, including over-the-counter medicines, vitamins, and herbal products.  Some medicines can affect how hydroxyzine works. Tell your doctor if you are using any of the following:  Droperidol, methadone, ondansetron, pentamidine  Antibiotic (including azithromycin, clarithromycin, erythromycin, gatifloxacin, moxifloxacin)  Medicine to treat depression (including citalopram, fluoxetine)  Medicine to treat heart rhythm problems (including amiodarone, procainamide, quinidine, sotalol)  Medicine to treat mental health problems (including chlorpromazine, clozapine, iloperidone, quetiapine, ziprasidone)  Keep all medicine out of the reach of children. Never share your medicine with anyone.  Warnings While Using This  Medicine:   Tell your doctor if you are pregnant or breastfeeding, or if you have heart disease, heart failure, a slow heartbeat, skin problems, or had a recent heart attack.  This medicine may cause the following problems:  Heart rhythm problems, including QT prolongation  Serious skin reaction, including acute generalized exanthematous pustulosis (AGEP)  This medicine may make you drowsy. Do not drive or do anything that could be dangerous until you know how this medicine affects you.  Call your doctor if your symptoms do not improve or if they get worse.  Keep all medicine out of the reach of children. Never share your medicine with anyone.  Possible Side Effects While Using This Medicine:   Call your doctor right away if you notice any of these side effects:  Allergic reaction: Itching or hives, swelling in your face or hands, swelling or tingling in your mouth or throat, chest tightness, trouble breathing  Fainting, dizziness, lightheadedness  Fast, pounding, or uneven heartbeat  Fever, skin rash  Severe tiredness  If you notice these less serious side effects, talk with your doctor:   Drowsiness, sleepiness  Dry mouth  If you notice other side effects that you think are caused by this medicine, tell your doctor.   Call your doctor for medical advice about side effects. You may report side effects to FDA at 3-357-FDA-8013  © Copyright [employer] 2023 Information is for End User's use only and may not be sold, redistributed or otherwise used for commercial purposes.  The above information is an  only. It is not intended as medical advice for individual conditions or treatments. Talk to your doctor, nurse or pharmacist before following any medical regimen to see if it is safe and effective for you.

## 2024-06-03 ENCOUNTER — APPOINTMENT (OUTPATIENT)
Dept: RADIOLOGY | Facility: CLINIC | Age: 29
End: 2024-06-03
Payer: COMMERCIAL

## 2024-06-03 ENCOUNTER — OFFICE VISIT (OUTPATIENT)
Dept: OBGYN CLINIC | Facility: CLINIC | Age: 29
End: 2024-06-03
Payer: COMMERCIAL

## 2024-06-03 ENCOUNTER — RA CDI HCC (OUTPATIENT)
Dept: OTHER | Facility: HOSPITAL | Age: 29
End: 2024-06-03

## 2024-06-03 VITALS
BODY MASS INDEX: 27.11 KG/M2 | HEART RATE: 73 BPM | HEIGHT: 69 IN | DIASTOLIC BLOOD PRESSURE: 72 MMHG | SYSTOLIC BLOOD PRESSURE: 109 MMHG | WEIGHT: 183 LBS

## 2024-06-03 DIAGNOSIS — M25.511 CHRONIC RIGHT SHOULDER PAIN: ICD-10-CM

## 2024-06-03 DIAGNOSIS — M25.511 RIGHT SHOULDER PAIN, UNSPECIFIED CHRONICITY: ICD-10-CM

## 2024-06-03 DIAGNOSIS — G89.29 CHRONIC RIGHT SHOULDER PAIN: ICD-10-CM

## 2024-06-03 DIAGNOSIS — M54.2 MYOFASCIAL NECK PAIN: ICD-10-CM

## 2024-06-03 DIAGNOSIS — M54.12 CERVICAL RADICULOPATHY: Primary | ICD-10-CM

## 2024-06-03 PROCEDURE — 73030 X-RAY EXAM OF SHOULDER: CPT

## 2024-06-03 PROCEDURE — 99203 OFFICE O/P NEW LOW 30 MIN: CPT | Performed by: ORTHOPAEDIC SURGERY

## 2024-06-03 PROCEDURE — 72040 X-RAY EXAM NECK SPINE 2-3 VW: CPT

## 2024-06-03 NOTE — PROGRESS NOTES
HCC coding opportunities          Chart Reviewed number of suggestions sent to Provider: 1     Patients Insurance        Commercial Insurance: [employer] Commercial [employer]     [id number]
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
## 2024-06-10 ENCOUNTER — OFFICE VISIT (OUTPATIENT)
Dept: FAMILY MEDICINE CLINIC | Facility: CLINIC | Age: 29
End: 2024-06-10
Payer: COMMERCIAL

## 2024-06-10 VITALS
WEIGHT: 180 LBS | SYSTOLIC BLOOD PRESSURE: 112 MMHG | HEIGHT: 69 IN | DIASTOLIC BLOOD PRESSURE: 68 MMHG | BODY MASS INDEX: 26.66 KG/M2 | RESPIRATION RATE: 17 BRPM | HEART RATE: 68 BPM

## 2024-06-10 DIAGNOSIS — Z00.00 ANNUAL PHYSICAL EXAM: Primary | ICD-10-CM

## 2024-06-10 DIAGNOSIS — Z11.4 SCREENING FOR HIV (HUMAN IMMUNODEFICIENCY VIRUS): ICD-10-CM

## 2024-06-10 DIAGNOSIS — F41.1 GAD (GENERALIZED ANXIETY DISORDER): ICD-10-CM

## 2024-06-10 DIAGNOSIS — Z11.59 NEED FOR HEPATITIS C SCREENING TEST: ICD-10-CM

## 2024-06-10 DIAGNOSIS — Z13.0 SCREENING FOR DEFICIENCY ANEMIA: ICD-10-CM

## 2024-06-10 DIAGNOSIS — Z23 NEED FOR VACCINATION: ICD-10-CM

## 2024-06-10 DIAGNOSIS — Z13.6 SCREENING FOR CARDIOVASCULAR CONDITION: ICD-10-CM

## 2024-06-10 DIAGNOSIS — R19.7 DIARRHEA, UNSPECIFIED TYPE: ICD-10-CM

## 2024-06-10 PROCEDURE — 90471 IMMUNIZATION ADMIN: CPT

## 2024-06-10 PROCEDURE — 99395 PREV VISIT EST AGE 18-39: CPT | Performed by: FAMILY MEDICINE

## 2024-06-10 PROCEDURE — 90715 TDAP VACCINE 7 YRS/> IM: CPT

## 2024-06-10 NOTE — PROGRESS NOTES
Adult Annual Physical  Name: Juan Nunn      : 1995      MRN: [id number]  Encounter Provider: Elisha Cazares DO  Encounter Date: 6/10/2024   Encounter department: [facility]    Assessment & Plan   1. Annual physical exam  2. GIO (generalized anxiety disorder)  Assessment & Plan:  Stable  Continue lexapro 10 mg a day   Advised to stop hydroxyzine since she is hoping to get pregnant soon   3. Need for vaccination  -     TDAP VACCINE GREATER THAN OR EQUAL TO 8YO IM  4. Diarrhea, unspecified type  -     TSH, 3rd generation; Future  -     Reticulin antibodies; Future  -     Alpha-Gal IgE Panel; Future  -     TSH, 3rd generation  -     Reticulin antibodies  -     Alpha-Gal IgE Panel  5. Screening for cardiovascular condition  -     Comprehensive metabolic panel; Future  -     Lipid Panel with Direct LDL reflex; Future  -     Comprehensive metabolic panel  -     Lipid Panel with Direct LDL reflex  6. Screening for deficiency anemia  -     CBC; Future  -     CBC  7. Need for hepatitis C screening test  -     Hepatitis C Antibody; Future  -     Hepatitis C Antibody  8. Screening for HIV (human immunodeficiency virus)  -     HIV 1/2 Antigen/Antibody (Fourth Generation) with Reflex Testing ([facility], [facility], or EXTERNAL LAB); Future  Immunizations and preventive care screenings were discussed with patient today. Appropriate education was printed on patient's after visit summary.    Counseling:  Dental Health: discussed importance of regular tooth brushing, flossing, and dental visits.  Exercise: the importance of regular exercise/physical activity was discussed. Recommend exercise 3-5 times per week for at least 30 minutes.   Discussed starting prenatal vitamins before she stops her birth control pills.  We also discussed that it takes about 3 months after stopping oral contraception to return back to normal fertility  Return in about 1 year (around 6/10/2025) for Annual physical.       History of Present  "Illness     Adult Annual Physical:  Patient presents for annual physical.     Diet and Physical Activity:  - Diet/Nutrition: well balanced diet.  - Exercise: moderate cardiovascular exercise.    General Health:  - Sleep: sleeps well.  - Hearing: normal hearing bilateral ears.  - Vision: wears glasses.  - Dental: regular dental visits.    /GYN Health:  - Follows with GYN: yes.   - Menopause: premenopausal.   - Contraception: oral contraceptives.      Review of Systems      Objective     /68   Pulse 68   Resp 17   Ht 5' 9\" (1.753 m)   Wt 81.6 kg (180 lb)   BMI 26.58 kg/m²     Physical Exam  Vitals and nursing note reviewed.   Constitutional:       Appearance: She is well-developed.   HENT:      Head: Normocephalic and atraumatic.      Right Ear: External ear normal.      Left Ear: External ear normal.      Nose: Nose normal.   Cardiovascular:      Rate and Rhythm: Normal rate and regular rhythm.      Heart sounds: Normal heart sounds. No murmur heard.     No friction rub.   Pulmonary:      Effort: No respiratory distress.      Breath sounds: Normal breath sounds. No wheezing or rales.   Abdominal:      Palpations: Abdomen is soft.      Tenderness: There is no abdominal tenderness.   Musculoskeletal:      Right lower leg: No edema.      Left lower leg: No edema.   Neurological:      Mental Status: She is oriented to person, place, and time.      Cranial Nerves: No cranial nerve deficit.      No results found.  Administrative Statements         "

## 2024-06-10 NOTE — ASSESSMENT & PLAN NOTE
Stable  Continue lexapro 10 mg a day   Advised to stop hydroxyzine since she is hoping to get pregnant soon

## 2024-06-13 ENCOUNTER — EVALUATION (OUTPATIENT)
Dept: PHYSICAL THERAPY | Facility: CLINIC | Age: 29
End: 2024-06-13
Payer: COMMERCIAL

## 2024-06-13 DIAGNOSIS — G89.29 CHRONIC RIGHT SHOULDER PAIN: ICD-10-CM

## 2024-06-13 DIAGNOSIS — M25.511 CHRONIC RIGHT SHOULDER PAIN: ICD-10-CM

## 2024-06-13 DIAGNOSIS — M54.2 MYOFASCIAL NECK PAIN: ICD-10-CM

## 2024-06-13 DIAGNOSIS — M54.12 CERVICAL RADICULOPATHY: ICD-10-CM

## 2024-06-13 PROCEDURE — 97110 THERAPEUTIC EXERCISES: CPT

## 2024-06-13 PROCEDURE — 97162 PT EVAL MOD COMPLEX 30 MIN: CPT

## 2024-06-13 NOTE — PROGRESS NOTES
PT Evaluation     Today's date: 2024  Patient name: Juan Nunn  : 1995  MRN: [id number]  Referring provider: Rm Patel*  Dx:   Encounter Diagnosis     ICD-10-CM    1. Chronic right shoulder pain  M25.511 Ambulatory Referral to Physical Therapy    G89.29       2. Cervical radiculopathy  M54.12 Ambulatory Referral to Physical Therapy      3. Myofascial neck pain  M54.2 Ambulatory Referral to Physical Therapy                     Assessment  Impairments: abnormal or restricted ROM, activity intolerance, impaired physical strength, lacks appropriate home exercise program, pain with function, scapular dyskinesis, poor posture  and poor body mechanics    Assessment details: Juan Nunn is a 29 y.o. female who presents with R shoulder and neck pain, R hand numbness and tingling, decreased strength, impaired sensation, and postural dysfunction. Due to these impairments, patient has difficulty performing ADL's, recreational activities, work-related activities, lifting/carrying, reaching. Patient's clinical presentation is consistent with their referring diagnosis of Chronic right shoulder pain, Cervical radiculopathy, Myofascial neck pain. Also noted increased median nerve tension during ULTT  Patient has been educated in home exercise program and plan of care. Patient would benefit from skilled physical therapy services to address their aforementioned functional limitations and progress towards prior level of function and independence with home exercise program.       Goals  Short Term Goals to be accomplished in 4 weeks:  STG1: Pt will be I with HEP to maximize progress between therapy sessions  STG2: Pt will be I with posture management to limit impingement.   STG3:  Pt will deny symptom radiation beyond shoulder at <50% intensity   STG4: Pt will demo 1/2 MMT grade inc in cervical stabilizers and shoulder strength to improve lifting/carrying  STG5: pt will report 50% improvement with tolerance  to ULTT to median nerve        Long Term Goals to be accomplished in 12 weeks:   LTG1: Pt will demo cervical stab/shoulder strength to WNL as per PLOF to improve lifting/carrying  LTG2: Pt will return to work related tasks as per PLOF pain free  LTG3: Pt will demo cervical AROM WNL to decrease impingement   LTG4: Pt will deny sleep disturbance due to pain  LTG5: Pt will demo median N ULTT WNL without symptoms into R hand      Plan  Patient would benefit from: PT eval and skilled physical therapy  Planned modality interventions: cryotherapy, thermotherapy: hydrocollator packs, traction and unattended electrical stimulation    Planned therapy interventions: manual therapy, neuromuscular re-education, self care, therapeutic activities, therapeutic exercise, home exercise program, IASTM, joint mobilization, postural training, strengthening, stretching and functional ROM exercises    Frequency: 2x week  Plan of Care beginning date: 2024  Plan of Care expiration date: 2024  Treatment plan discussed with: patient  Plan details: HEP development, stretching, strengthening, A/AA/PROM, joint mobilizations, posture education, STM/MI as needed to reduce muscle tension, muscle reeducation, PLOC discussed and agreed upon with patient.            Subjective Evaluation    History of Present Illness  Mechanism of injury: Patient reports to PT for cervical pain that began about 3 weeks ago. No exacerbating factor noted by patient. Patient reports pain started to spread into shoulder first then eventually into R hand. Patient gets intermittent numbness and tingling in digits 1-3 of R hand. Patient reports movement of T/S and shoulder seems to decrease symptoms a bit, but does not last.   Patient Goals  Patient goals for therapy: decreased pain, increased motion, increased strength, independence with ADLs/IADLs, return to sport/leisure activities and return to work    Pain  Current pain ratin  At best pain ratin  At  "worst pain ratin  Location: cervical into RUE  Quality: cervical: sharp ; RUE: dull ache w/ numbness and tingling in R hand.  Alleviating factors: movement.  Aggravating factors: sitting and keyboarding    Social Support  Stairs in house: yes   Lives in: multiple-level home  Lives with: fiance.    Employment status: working (desk work)  Hand dominance: right      Diagnostic Tests  X-ray: normal        Objective    Posture: rounded shoulders in unsupported sitting, slightly improved with supported sitting.     Cervicial AROM limitation (* = pain)      Flexion: Min lewis*(ERP)  Extension: Min lewis*(ERP)  R rotation: WNL  L rotation: WNL  R sidebend: WNL  L sidebend: WNL  Protraction: WNL  Retraction: Mod lewis     Thoracic AROM limitations (* = pain)  Thoracic rot R WNL  Thoracic rot L  WNL    Shoulder AROM: standing      R  L  Flexion :  WNL  WNL  Abduction:  WNL  WNL  Functional IR:  WNL  WNL  Functional ER: WNL  WNL    Strength: MMT R  L  Shoulder flexion: 4+/5  4+/5    Shoulder abduction: 4/5  4/5    Shoulder ER:  5/5  5/5  Shoulder IR:  5/5  5/5  Elbow flex:  5/5  5/5  Elbow ext:  5/5  5/5        Mechanical Assessment:  7/10 pain in cervical spine down RUE w/ numbness and tingling into R hand (digits 1-3)   Repeated retraction: 1x10 inc/nw in shoulder (NE to R hand)   Repeated retraction w/ extension: 1x5 inc/w in shoulder     C6 central slider(pressure towards L from R side cervical): 1x10 NE; 2x10 dec intensity with median N tension test (ROM remains limited)     Manual traction: tolerated well during, however reported increased shoulder pain post (30\" x3)       Tenderness/Palpation: Mod TTP in R lower cervical region into R UT and posterior shoulder       ULTT: RUE  Median: Pos at 90deg   Ulnar: neg   Radial: neg       Shoulder Special Tests:   Impingement:   Parkinson yasemin: neg  Painful arc: neg  Neers: neg  Infraspinatus: neg             Precautions:   Past Medical History:   Diagnosis Date    Asthma     " Depression      SOC: 6/13/2024  FOTO: 6/13/2024  POC Expiration: 9/5/2024  Daily Treatment Log:  Date 6/13/2024       Visit # 1       Auth         Auth exp        Manual        IASTM to UT         C6 central sliders (pressure from R side to L)                 There Exer 10'       UBE         Door way stretch         Wall slide         B/L shld ER                                         Pt edu On proper posture, cervical and shoulder anatomy, and all questions answered.        HEP Created and discussed        There Activ                                                        NMReed        Supine median nerve glide w/ TR for support         Scapular retractions         RS in supine cw/ccw                        Modalities                                HEP:   Access Code: [email or web address]  URL: [email or web address]  Date: 06/13/2024  Prepared by: Archana Hudson    Exercises  - Ulnar Nerve/Median Glide- Low Level  - 1 x daily - 7 x weekly - 1 sets - 10 reps  - Doorway Pec Stretch at 60 Elevation  - 1 x daily - 7 x weekly - 4 reps - 10 sec hold

## 2024-06-17 ENCOUNTER — OFFICE VISIT (OUTPATIENT)
Dept: PHYSICAL THERAPY | Facility: CLINIC | Age: 29
End: 2024-06-17
Payer: COMMERCIAL

## 2024-06-17 DIAGNOSIS — M25.511 CHRONIC RIGHT SHOULDER PAIN: Primary | ICD-10-CM

## 2024-06-17 DIAGNOSIS — M54.12 CERVICAL RADICULOPATHY: ICD-10-CM

## 2024-06-17 DIAGNOSIS — M54.2 MYOFASCIAL NECK PAIN: ICD-10-CM

## 2024-06-17 DIAGNOSIS — G89.29 CHRONIC RIGHT SHOULDER PAIN: Primary | ICD-10-CM

## 2024-06-17 PROCEDURE — 97112 NEUROMUSCULAR REEDUCATION: CPT

## 2024-06-17 PROCEDURE — 97110 THERAPEUTIC EXERCISES: CPT

## 2024-06-17 NOTE — PROGRESS NOTES
"Daily Note     Today's date: 2024  Patient name: Juan Nunn  : 1995  MRN: [id number]  Referring provider: Rm Patel*  Dx:   Encounter Diagnosis     ICD-10-CM    1. Chronic right shoulder pain  M25.511     G89.29       2. Cervical radiculopathy  M54.12       3. Myofascial neck pain  M54.2                      Subjective: Patient reports the tinging in her hand is a bit better. Shoulder remains the same.       Objective: See treatment diary below      Assessment: Tolerated treatment well. Cont with high symptom irritability in shoulder however noted improvement in ROM with ULTT after central sliders. Mild increase in tingling with progressed median N glide however did not last. Patient would benefit from continued PT      Plan: Continue per plan of care.      Precautions:   Past Medical History:   Diagnosis Date    Asthma     Depression      SOC: 2024  FOTO: 2024  POC Expiration: 2024  Daily Treatment Log:  Date 2024      Visit # 1 2      Auth         Auth exp        Manual  10'      IASTM to UT   EG      C6 central sliders (pressure from R side to L)   EG              There Exer 10' 10'       UBE   3' bckw      Door way stretch   10\" x4 (mild increase in shoulder pain on 3rd and 4th rep)       Wall slide   3\" x10       B/L shld ER   1x10                                       Pt edu On proper posture, cervical and shoulder anatomy, and all questions answered.        HEP Created and discussed  Discussed lowering UE position for doorway stretch for tolerance       There Activ                                                        NMReed  15'      Supine median nerve glide w/ TR for support   5x (mild increase in tingling did not last)      Rows   YTB 1x10       RS in supine cw/ccw  1x10 (slight increase in pain with ccw)                       Modalities                                HEP:   Access Code: [email or web address]  URL: [email or web address]  Date: " 06/13/2024  Prepared by: Archana Hudson    Exercises  - Ulnar Nerve/Median Glide- Low Level  - 1 x daily - 7 x weekly - 1 sets - 10 reps  - Doorway Pec Stretch at 60 Elevation  - 1 x daily - 7 x weekly - 4 reps - 10 sec hold

## 2024-06-21 ENCOUNTER — OFFICE VISIT (OUTPATIENT)
Dept: PHYSICAL THERAPY | Facility: CLINIC | Age: 29
End: 2024-06-21
Payer: COMMERCIAL

## 2024-06-21 DIAGNOSIS — M54.12 CERVICAL RADICULOPATHY: ICD-10-CM

## 2024-06-21 DIAGNOSIS — M54.2 MYOFASCIAL NECK PAIN: ICD-10-CM

## 2024-06-21 DIAGNOSIS — M25.511 CHRONIC RIGHT SHOULDER PAIN: Primary | ICD-10-CM

## 2024-06-21 DIAGNOSIS — G89.29 CHRONIC RIGHT SHOULDER PAIN: Primary | ICD-10-CM

## 2024-06-21 PROCEDURE — 97140 MANUAL THERAPY 1/> REGIONS: CPT

## 2024-06-21 PROCEDURE — 97110 THERAPEUTIC EXERCISES: CPT

## 2024-06-21 PROCEDURE — 97112 NEUROMUSCULAR REEDUCATION: CPT

## 2024-06-21 NOTE — PROGRESS NOTES
"Daily Note     Today's date: 2024  Patient name: Juan Nunn  : 1995  MRN: [id number]  Referring provider: Rm Patel*  Dx:   Encounter Diagnosis     ICD-10-CM    1. Chronic right shoulder pain  M25.511     G89.29       2. Cervical radiculopathy  M54.12       3. Myofascial neck pain  M54.2                      Subjective: Patient reports she is a bit sore today, which began when she woke up. Patient reports she is getting the tingling in R hand along with soreness in shoulder into tricep.       Objective: See treatment diary below  2/10 shoulder pain into tricep and 3/10 tingling in R hand   Repeated cervical flexion: 1x10 NE   Repeated retraction w/ flexion: unable to tolerate     Repeated R side bending: 1x10 NE     Repeated L sidebending: 1x10 NE    Assessment: Tolerated treatment well with no response to mechanical assessment today. Will continue with manual work and gentle stretching at this time as patient demo slight decrease in shoulder soreness with this. Patient edu on updated HEP. Patient does cont with high symptom irritability in shoulder. Patient would benefit from continued PT      Plan: Continue per plan of care.      Precautions:   Past Medical History:   Diagnosis Date    Asthma     Depression      SOC: 2024  FOTO: 2024  POC Expiration: 2024  Daily Treatment Log:  Date 2024     Visit # 1 2 3     Auth    3/12     Auth exp   2024     Manual  10' 10'     IASTM to UT   EG EG     C6 central sliders (pressure from R side to L)   EG EG             There Exer 10' 10'  15'     UBE   3' bckw 3' bckw     Door way stretch   10\" x4 (mild increase in shoulder pain on 3rd and 4th rep)       Wall slide   3\" x10  3\" x10      B/L shld ER   1x10  1x10      Cervical flexion    3\" x10      Levator scap stretch    5\" x5 R/L alt                      Pt edu On proper posture, cervical and shoulder anatomy, and all questions answered.        HEP Created " and discussed  Discussed lowering UE position for doorway stretch for tolerance  Updated and discussed      There Activ                                                        NMReed  15' 15'     Supine median nerve glide w/ TR for support   5x (mild increase in tingling did not last) 5x (mild increase in tingling did not last)     Rows   YTB 1x10       RS in supine cw/ccw  1x10 (slight increase in pain with ccw)               Mechanical assessment    10'      Modalities        Cervical MH pre session    Skin intact pre/post 5'                      HEP:   Access Code: [email or web address]  URL: [email or web address]  Date: 06/21/2024  Prepared by: Archana Hudson    Exercises  - Median Nerve Flossing - Tray  - 1 x daily - 7 x weekly - 2 sets - 5 reps  - Doorway Pec Stretch at 60 Degrees Abduction with Arm Straight  - 1 x daily - 7 x weekly - 2 sets - 5 reps - 5 sec hold  - Neck Flexion Stretch  - 1 x daily - 7 x weekly - 2 sets - 5 reps - 5 sec hold  - Gentle Levator Scapulae Stretch  - 1 x daily - 7 x weekly - 1 sets - 5 reps - 5 sec hold

## 2024-06-24 ENCOUNTER — TELEPHONE (OUTPATIENT)
Dept: PHYSICAL THERAPY | Facility: CLINIC | Age: 29
End: 2024-06-24

## 2024-06-25 ENCOUNTER — APPOINTMENT (OUTPATIENT)
Dept: PHYSICAL THERAPY | Facility: CLINIC | Age: 29
End: 2024-06-25
Payer: COMMERCIAL

## 2024-06-27 ENCOUNTER — APPOINTMENT (OUTPATIENT)
Dept: PHYSICAL THERAPY | Facility: CLINIC | Age: 29
End: 2024-06-27
Payer: COMMERCIAL

## 2024-07-01 ENCOUNTER — OFFICE VISIT (OUTPATIENT)
Dept: PHYSICAL THERAPY | Facility: CLINIC | Age: 29
End: 2024-07-01
Payer: COMMERCIAL

## 2024-07-01 DIAGNOSIS — M25.511 CHRONIC RIGHT SHOULDER PAIN: Primary | ICD-10-CM

## 2024-07-01 DIAGNOSIS — G89.29 CHRONIC RIGHT SHOULDER PAIN: Primary | ICD-10-CM

## 2024-07-01 DIAGNOSIS — M54.2 MYOFASCIAL NECK PAIN: ICD-10-CM

## 2024-07-01 DIAGNOSIS — M54.12 CERVICAL RADICULOPATHY: ICD-10-CM

## 2024-07-01 PROCEDURE — 97110 THERAPEUTIC EXERCISES: CPT

## 2024-07-01 PROCEDURE — 97140 MANUAL THERAPY 1/> REGIONS: CPT

## 2024-07-01 PROCEDURE — 97112 NEUROMUSCULAR REEDUCATION: CPT

## 2024-07-01 NOTE — PROGRESS NOTES
"Daily Note     Today's date: 2024  Patient name: Juan Nunn  : 1995  MRN: [id number]  Referring provider: Rm Patel*  Dx:   Encounter Diagnosis     ICD-10-CM    1. Chronic right shoulder pain  M25.511     G89.29       2. Cervical radiculopathy  M54.12       3. Myofascial neck pain  M54.2                      Subjective: Patient reports the tingling is better than it used to be but not completely gone. Occasionally she gets a stronger tingling but this is not happening as often as before. Patient reports the shoulder pain has improved mildly.       Objective: See treatment diary below      Assessment: Tolerated treatment well with no increase in symptoms reported. Patient does cont with high symptom irritability in shoulder, however does seem to be improving gradually. Patient would benefit from continued PT      Plan: Continue per plan of care.      Precautions:   Past Medical History:   Diagnosis Date    Asthma     Depression      SOC: 2024  FOTO: 2024  POC Expiration: 2024  Daily Treatment Log:  Date 2024    Visit # 1 2 3 4    Auth    3/12     Auth exp   2024    Manual  10' 10' 15'    IASTM to UT   EG EG EG    C6 central sliders (pressure from R side to L)   EG EG EG    Suboccipital release     EG    There Exer 10' 10'  15' 15'    UBE   3' bckw 3' bckw 1.5' fwd/ 1.5' bckw    Door way stretch   10\" x4 (mild increase in shoulder pain on 3rd and 4th rep)       Wall slide   3\" x10  3\" x10  5\" x10     B/L shld ER   1x10  1x10  YTB 2x5     Cervical flexion    3\" x10      Levator scap stretch    5\" x5 R/L alt                      Pt edu On proper posture, cervical and shoulder anatomy, and all questions answered.        HEP Created and discussed  Discussed lowering UE position for doorway stretch for tolerance  Updated and discussed  Updated and discussed    There Activ                                                      " "  NMReed  15' 15' 10'    Supine median nerve glide w/ TR for support   5x (mild increase in tingling did not last) 5x (mild increase in tingling did not last) 5x (mild increase in tingling did not last)    Rows   YTB 1x10       RS in supine cw/ccw  1x10 (slight increase in pain with ccw)       Supine cervical retractions     3\" x10     Mechanical assessment    10'      Modalities        Cervical MH pre session    Skin intact pre/post 5'  Skin intact pre/post 5'                     HEP:   Access Code: [email or web address]  URL: [email or web address]  Date: 06/21/2024  Prepared by: Archana Hudson    Exercises  - Median Nerve Flossing - Tray  - 1 x daily - 7 x weekly - 2 sets - 5 reps  - Doorway Pec Stretch at 60 Degrees Abduction with Arm Straight  - 1 x daily - 7 x weekly - 2 sets - 5 reps - 5 sec hold  - Neck Flexion Stretch  - 1 x daily - 7 x weekly - 2 sets - 5 reps - 5 sec hold  - Gentle Levator Scapulae Stretch  - 1 x daily - 7 x weekly - 1 sets - 5 reps - 5 sec hold           "

## 2024-07-02 ENCOUNTER — OFFICE VISIT (OUTPATIENT)
Dept: PHYSICAL THERAPY | Facility: CLINIC | Age: 29
End: 2024-07-02
Payer: COMMERCIAL

## 2024-07-02 DIAGNOSIS — M25.511 CHRONIC RIGHT SHOULDER PAIN: Primary | ICD-10-CM

## 2024-07-02 DIAGNOSIS — M54.12 CERVICAL RADICULOPATHY: ICD-10-CM

## 2024-07-02 DIAGNOSIS — M54.2 MYOFASCIAL NECK PAIN: ICD-10-CM

## 2024-07-02 DIAGNOSIS — G89.29 CHRONIC RIGHT SHOULDER PAIN: Primary | ICD-10-CM

## 2024-07-02 PROCEDURE — 97140 MANUAL THERAPY 1/> REGIONS: CPT

## 2024-07-02 PROCEDURE — 97112 NEUROMUSCULAR REEDUCATION: CPT

## 2024-07-02 PROCEDURE — 97110 THERAPEUTIC EXERCISES: CPT

## 2024-07-02 NOTE — PROGRESS NOTES
"Daily Note     Today's date: 2024  Patient name: Juan Nunn  : 1995  MRN: [id number]  Referring provider: Rm Patel*  Dx:   Encounter Diagnosis     ICD-10-CM    1. Chronic right shoulder pain  M25.511     G89.29       2. Cervical radiculopathy  M54.12       3. Myofascial neck pain  M54.2                        Subjective: Patient reports some tingling with nerve glides in supine at home, but it does not linger afterwards.      Objective: See treatment diary below      Assessment: Tolerated treatment well with mild increase in tingling during progression to seated median N glide with UE supported, this did not last after exercise. Patient demo gradually improving tolerance to exercise. Patient would benefit from continued PT      Plan: Continue per plan of care.      Precautions:   Past Medical History:   Diagnosis Date    Asthma     Depression      SOC: 2024  FOTO: 2024  POC Expiration: 2024  Daily Treatment Log:  Date 2024   Visit # 1 2 3 4(FOTO) 5   Auth    3/12 4/12 5/12   Auth exp   2024   Manual  10' 10' 15' 15'   IASTM to UT   EG EG EG EG   C6 central sliders (pressure from R side to L)   EG EG EG EG x20 performed twice   Suboccipital release     EG EG   There Exer 10' 10'  15' 15' 13'   UBE   3' bckw 3' bckw 1.5' fwd/ 1.5' bckw 1.5' fwd/ 1.5' bckw   Door way stretch   10\" x4 (mild increase in shoulder pain on 3rd and 4th rep)    5\" x3 (sligh increase in distal tricep did not linger)    Wall slide   3\" x10  3\" x10  5\" x10  5\" x10    B/L shld ER   1x10  1x10  YTB 2x5  YTB 2x5    Cervical flexion    3\" x10   HOLD    Levator scap stretch    5\" x5 R/L alt                      Pt edu On proper posture, cervical and shoulder anatomy, and all questions answered.        HEP Created and discussed  Discussed lowering UE position for doorway stretch for tolerance  Updated and discussed  Updated and discussed  " "  There Activ                                                        NMReed  15' 15' 10' 10'   Supine median nerve glide w/ TR for support   5x (mild increase in tingling did not last) 5x (mild increase in tingling did not last) 5x (mild increase in tingling did not last) Seated w/ US supported 5x (mild increase in tingling did not last)   Rows   YTB 1x10    Y tubing 1x10    RS in supine cw/ccw  1x10 (slight increase in pain with ccw)       Supine cervical retractions     3\" x10  3\" x10    Mechanical assessment    10'      Modalities        Cervical MH pre session    Skin intact pre/post 5'  Skin intact pre/post 5'                     HEP:   Access Code: [email or web address]  URL: [email or web address]  Date: 07/01/2024  Prepared by: Archana Hudson    Exercises  - Median Nerve Flossing - Tray  - 1 x daily - 7 x weekly - 2 sets - 5 reps  - Doorway Pec Stretch at 60 Degrees Abduction with Arm Straight  - 1 x daily - 7 x weekly - 2 sets - 5 reps - 5 sec hold  - Gentle Levator Scapulae Stretch  - 1 x daily - 7 x weekly - 1 sets - 5 reps - 5 sec hold  - Supine Passive Cervical Retraction  - 2-3 x daily - 7 x weekly - 1 sets - 10 reps  - Shoulder External Rotation and Scapular Retraction with Resistance  - 1 x daily - 7 x weekly - 1 sets - 10 reps           "

## 2024-07-06 DIAGNOSIS — F41.1 GAD (GENERALIZED ANXIETY DISORDER): ICD-10-CM

## 2024-07-06 DIAGNOSIS — F32.A MILD DEPRESSION: ICD-10-CM

## 2024-07-07 RX ORDER — ESCITALOPRAM OXALATE 10 MG/1
10 TABLET ORAL DAILY
Qty: 90 TABLET | Refills: 1 | Status: SHIPPED | OUTPATIENT
Start: 2024-07-07

## 2024-07-08 ENCOUNTER — APPOINTMENT (OUTPATIENT)
Dept: PHYSICAL THERAPY | Facility: CLINIC | Age: 29
End: 2024-07-08
Payer: COMMERCIAL

## 2024-07-08 ENCOUNTER — TELEPHONE (OUTPATIENT)
Dept: PHYSICAL THERAPY | Facility: CLINIC | Age: 29
End: 2024-07-08

## 2024-07-08 NOTE — TELEPHONE ENCOUNTER
P called to cancel for today and tomorrow.  She is getting  this week and doesn't have time.  She says she will be back on 7/24.

## 2024-07-09 ENCOUNTER — APPOINTMENT (OUTPATIENT)
Dept: PHYSICAL THERAPY | Facility: CLINIC | Age: 29
End: 2024-07-09
Payer: COMMERCIAL

## 2024-07-22 ENCOUNTER — TELEPHONE (OUTPATIENT)
Dept: PHYSICAL THERAPY | Facility: CLINIC | Age: 29
End: 2024-07-22

## 2024-07-22 NOTE — TELEPHONE ENCOUNTER
Pt LM saying she needed to move her appt time for 7/24 to an earlier time, called pt back to try to accommodate this. Pt reported that she was okay with the current time of her appt for wed and confirmed thur appt time.

## 2024-07-24 ENCOUNTER — OFFICE VISIT (OUTPATIENT)
Dept: PHYSICAL THERAPY | Facility: CLINIC | Age: 29
End: 2024-07-24
Payer: COMMERCIAL

## 2024-07-24 DIAGNOSIS — M54.12 CERVICAL RADICULOPATHY: ICD-10-CM

## 2024-07-24 DIAGNOSIS — M54.2 MYOFASCIAL NECK PAIN: ICD-10-CM

## 2024-07-24 DIAGNOSIS — M25.511 CHRONIC RIGHT SHOULDER PAIN: Primary | ICD-10-CM

## 2024-07-24 DIAGNOSIS — G89.29 CHRONIC RIGHT SHOULDER PAIN: Primary | ICD-10-CM

## 2024-07-24 PROCEDURE — 97112 NEUROMUSCULAR REEDUCATION: CPT

## 2024-07-24 PROCEDURE — 97140 MANUAL THERAPY 1/> REGIONS: CPT

## 2024-07-24 NOTE — PROGRESS NOTES
"Daily Note     Today's date: 2024  Patient name: Juan Nunn  : 1995  MRN: [id number]  Referring provider: Rm Patel*  Dx:   Encounter Diagnosis     ICD-10-CM    1. Chronic right shoulder pain  M25.511     G89.29       2. Cervical radiculopathy  M54.12       3. Myofascial neck pain  M54.2                      Subjective: has not experienced any tingling in her R hand, maybe once in the last 3 weeks. It feels like the pain is different now, it used to be more in the lower forearm but now more so in the lateral arm and in her armpit and under her collar bone. On arrival to session pain in posterior R arm and discomfort into her R cerv/UT and into her shoulder blade 7/10. \"The wall slides stretch feels really good\"       Objective: See treatment diary below  Supine cerv retract w/ OP 2x10 = NE   Cerv retract w/ manual ext over edge= NE   Supine cerv R SB 1x10=dec/b? R post arm symptoms; dec tightness   Seated self cerv R SB 1x10= NE   Seated thoracic ext over MB=NE  LACY w/ cerv ext OP w/ thumbs 1x3=incr/NW inner arm; central spine     Assessment: Tolerated treatment well. Patient would benefit from continued PT to further investigate [facility] assessment as pt's progress with supine cerv retract has reached a plateau. Pt to trial seated R SB at home until next visit as this was the only thing today that may have reduced her symptoms. Pt to assess response and report back next session.       Plan: Continue per plan of care.      Precautions:   Past Medical History:   Diagnosis Date    Asthma     Depression      SOC: 2024  FOTO: 2024  POC Expiration: 2024  Daily Treatment Log:  Date 2024   Visit # 6  3 4(FOTO) 5   Auth    3/12 4/12 5/12   Auth exp   2024   Manual 20'   10' 15' 15'   IASTM to UT  RB   EG EG EG   C6 central sliders (pressure from R side to L)  20x   EG EG EG x20 performed twice   Suboccipital release  RB    EG EG " "  There Exer   15' 15' 13'   UBE    3' bckw 1.5' fwd/ 1.5' bckw 1.5' fwd/ 1.5' bckw   Door way stretch   10\" x4 (mild increase in shoulder pain on 3rd and 4th rep)    5\" x3 (sligh increase in distal tricep did not linger)    Wall slide   3\" x10  3\" x10  5\" x10  5\" x10    B/L shld ER   1x10  1x10  YTB 2x5  YTB 2x5    Cervical flexion    3\" x10   HOLD    Levator scap stretch    5\" x5 R/L alt                      Pt edu        HEP  Discussed lowering UE position for doorway stretch for tolerance  Updated and discussed  Updated and discussed    There Activ                                                        NMReed 25'  15' 15' 10' 10'   Supine median nerve glide w/ TR for support   5x (mild increase in tingling did not last) 5x (mild increase in tingling did not last) 5x (mild increase in tingling did not last) Seated w/ US supported 5x (mild increase in tingling did not last)   Rows   YTB 1x10    Y tubing 1x10    RS in supine cw/ccw  1x10 (slight increase in pain with ccw)       Supine cervical retractions  5\"x10; 2x     3\" x10  3\" x10    Mechanical assessment  20'   10'      Modalities        Cervical MH pre session    Skin intact pre/post 5'  Skin intact pre/post 5'                     HEP:   Access Code: [email or web address]  URL: [email or web address]  Date: 07/01/2024  Prepared by: Archana Hudson    Exercises  - Median Nerve Flossing - Tray  - 1 x daily - 7 x weekly - 2 sets - 5 reps  - Doorway Pec Stretch at 60 Degrees Abduction with Arm Straight  - 1 x daily - 7 x weekly - 2 sets - 5 reps - 5 sec hold  - Gentle Levator Scapulae Stretch  - 1 x daily - 7 x weekly - 1 sets - 5 reps - 5 sec hold  - Supine Passive Cervical Retraction  - 2-3 x daily - 7 x weekly - 1 sets - 10 reps  - Shoulder External Rotation and Scapular Retraction with Resistance  - 1 x daily - 7 x weekly - 1 sets - 10 reps      "

## 2024-07-25 ENCOUNTER — OFFICE VISIT (OUTPATIENT)
Dept: PHYSICAL THERAPY | Facility: CLINIC | Age: 29
End: 2024-07-25
Payer: COMMERCIAL

## 2024-07-25 DIAGNOSIS — G89.29 CHRONIC RIGHT SHOULDER PAIN: Primary | ICD-10-CM

## 2024-07-25 DIAGNOSIS — M54.12 CERVICAL RADICULOPATHY: ICD-10-CM

## 2024-07-25 DIAGNOSIS — M25.511 CHRONIC RIGHT SHOULDER PAIN: Primary | ICD-10-CM

## 2024-07-25 DIAGNOSIS — M54.2 MYOFASCIAL NECK PAIN: ICD-10-CM

## 2024-07-25 PROCEDURE — 97140 MANUAL THERAPY 1/> REGIONS: CPT

## 2024-07-25 PROCEDURE — 97110 THERAPEUTIC EXERCISES: CPT

## 2024-07-25 NOTE — PROGRESS NOTES
"Daily Note     Today's date: 2024  Patient name: Juan Nunn  : 1995  MRN: [id number]  Referring provider: Rm Patel*  Dx:   Encounter Diagnosis     ICD-10-CM    1. Chronic right shoulder pain  M25.511     G89.29       2. Cervical radiculopathy  M54.12       3. Myofascial neck pain  M54.2                      Subjective: has not experienced any tingling in her R hand or tricep recently. Currently has more shoulder pain and into axilla. Patient reports the pain is more \"pinchy\"/tight and no longer achy. Patient states side bending helped at home.     Objective: See treatment diary below    Assessment: Tolerated treatment well with slight decrease in shoulder symptoms noted with R SB trial with HEP since last session. Patient also demo improvement in discomfort in C/S following cervical traction. May trial mechanical traction in future sessions if tolerated. Patient denied any pain with kneeling lat stretch. Patient would benefit from continued PT       Plan: Continue per plan of care.      Precautions:   Past Medical History:   Diagnosis Date    Asthma     Depression      SOC: 2024  FOTO: 2024  POC Expiration: 2024  Daily Treatment Log:  Date 2024   Visit # 6 7 (FOTO)    5   Auth         Auth exp     2024   Manual 20'  20'    15'   IASTM to UT  RB  EG   EG   C6 central sliders (pressure from R side to L)  20x  20x   EG x20 performed twice   Suboccipital release  RB  EG   EG   Manual traction   EG      There Exer  15   13'   UBE      1.5' fwd/ 1.5' bckw   Door way stretch      5\" x3 (sligh increase in distal tricep did not linger)    Wall slide   5\" x10    5\" x10    B/L shld ER      YTB 2x5    Levator scap stretch         Kneeling Lat stretch at table   10\" x5               Pt edu        HEP  Updated and discussed       There Activ                                                        NMReed 25'     10'   Supine median nerve glide w/ TR for " "support      Seated w/ US supported 5x (mild increase in tingling did not last)   Rows      Y tubing 1x10    RS in supine cw/ccw        Supine cervical retractions  5\"x10; 2x   R SB 1x10    3\" x10    Mechanical assessment  20'        Modalities        Cervical MH pre session         Mechanical traction                 HEP:   Access Code: [email or web address]  URL: [email or web address]  Date: 07/25/2024  Prepared by: Archana Hudson    Exercises  - Median Nerve Flossing - Tray  - 1 x daily - 7 x weekly - 2 sets - 5 reps  - Doorway Pec Stretch at 60 Degrees Abduction with Arm Straight  - 1 x daily - 7 x weekly - 2 sets - 5 reps - 5 sec hold  - Gentle Levator Scapulae Stretch  - 1 x daily - 7 x weekly - 1 sets - 5 reps - 5 sec hold  - Supine Passive Cervical Retraction  - 2-3 x daily - 7 x weekly - 1 sets - 10 reps  - Shoulder External Rotation and Scapular Retraction with Resistance  - 1 x daily - 7 x weekly - 1 sets - 10 reps  - Prone Chest Stretch on Chair  - 1 x daily - 7 x weekly - 3 sets - 10 reps  - Seated Cervical Sidebending AROM  - 2-3 x daily - 7 x weekly - 1 sets - 10 reps      "

## 2024-07-29 ENCOUNTER — EVALUATION (OUTPATIENT)
Dept: PHYSICAL THERAPY | Facility: CLINIC | Age: 29
End: 2024-07-29
Payer: COMMERCIAL

## 2024-07-29 DIAGNOSIS — G89.29 CHRONIC RIGHT SHOULDER PAIN: Primary | ICD-10-CM

## 2024-07-29 DIAGNOSIS — M54.12 CERVICAL RADICULOPATHY: ICD-10-CM

## 2024-07-29 DIAGNOSIS — M25.511 CHRONIC RIGHT SHOULDER PAIN: Primary | ICD-10-CM

## 2024-07-29 DIAGNOSIS — M54.2 MYOFASCIAL NECK PAIN: ICD-10-CM

## 2024-07-29 PROCEDURE — 97140 MANUAL THERAPY 1/> REGIONS: CPT

## 2024-07-29 PROCEDURE — 97110 THERAPEUTIC EXERCISES: CPT

## 2024-07-29 NOTE — PROGRESS NOTES
PT Re-Evaluation     Today's date: 2024  Patient name: Juan Nunn  : 1995  MRN: [id number]  Referring provider: Rm Patel*  Dx:   Encounter Diagnosis     ICD-10-CM    1. Chronic right shoulder pain  M25.511     G89.29       2. Cervical radiculopathy  M54.12       3. Myofascial neck pain  M54.2                      Assessment  Impairments: abnormal or restricted ROM, activity intolerance, impaired physical strength, lacks appropriate home exercise program, pain with function, scapular dyskinesis, poor posture  and poor body mechanics    Assessment details: Juan Nunn is a 29 y.o. female who presents with improvements in R shoulder and neck pain, R hand numbness and tingling, shoulder strength, and postural awareness, however deficits are still present. Due to these impairments, patient has difficulty performing ADL's, recreational activities, work-related activities, lifting/carrying, reaching. Patient's clinical presentation is consistent with their referring diagnosis of Chronic right shoulder pain, Cervical radiculopathy, Myofascial neck pain. Patient has been educated in home exercise program and plan of care. Patient would benefit from skilled physical therapy services to address their aforementioned functional limitations and progress towards prior level of function and independence with home exercise program.       Goals  Short Term Goals to be accomplished in 4 weeks:  STG1: Pt will be I with HEP to maximize progress between therapy sessions---MET  STG2: Pt will be I with posture management to limit impingement. ---MET  STG3:  Pt will deny symptom radiation beyond shoulder at <50% intensity ---MET  STG4: Pt will demo 1/2 MMT grade inc in cervical stabilizers and shoulder strength to improve lifting/carrying---MET  STG5: pt will report 50% improvement with tolerance to ULTT to median nerve ---MET       Long Term Goals to be accomplished in 12 weeks:   LTG1: Pt will demo cervical  stab/shoulder strength to WNL as per PLOF to improve lifting/carrying---progressing towards  LTG2: Pt will return to work related tasks as per PLOF pain free---progressing towards  LTG3: Pt will demo cervical AROM WNL to decrease impingement ---progressing towards  LTG4: Pt will deny sleep disturbance due to pain ---MET  LTG5: Pt will demo median N ULTT WNL without symptoms into R hand---progressing towards      Plan  Patient would benefit from: PT eval and skilled physical therapy  Planned modality interventions: cryotherapy, thermotherapy: hydrocollator packs, traction and unattended electrical stimulation    Planned therapy interventions: manual therapy, neuromuscular re-education, self care, therapeutic activities, therapeutic exercise, home exercise program, IASTM, joint mobilization, postural training, strengthening, stretching and functional ROM exercises    Frequency: 2x week  Plan of Care beginning date: 2024  Plan of Care expiration date: 10/4/2024  Treatment plan discussed with: patient  Plan details: HEP development, stretching, strengthening, A/AA/PROM, joint mobilizations, posture education, STM/MI as needed to reduce muscle tension, muscle reeducation, PLOC discussed and agreed upon with patient.            Subjective Evaluation    History of Present Illness  Mechanism of injury: Patient reports to PT for cervical pain that radiates into shoulder. Patient reports no hand symptoms in the last 2 weeks. Patient continues to have some neck and shoulder pain. Patient reports today is a good day and the pain level is not as high.   Patient Goals  Patient goals for therapy: decreased pain, increased motion, increased strength, independence with ADLs/IADLs, return to sport/leisure activities and return to work    Pain  Current pain ratin  At best pain ratin  At worst pain ratin  Location: cervical into RUE  Quality: sharp and dull ache  Alleviating factors: movement.  Aggravating factors:  "sitting and keyboarding    Social Support  Stairs in house: yes   Lives in: multiple-level home  Lives with: maira.    Employment status: working (desk work)  Hand dominance: right      Diagnostic Tests  X-ray: normal        Objective    Posture: decreased rounding in shoulders during unsupported and supported sitting compared to IE     Cervicial AROM limitation (* = pain)      Flexion: Min lewis*(ERP)  Extension: Min lewis*(ERP)  R rotation: WNL  L rotation: WNL  R sidebend: WNL  L sidebend: WNL  Protraction: WNL  Retraction: Min lewis     Thoracic AROM limitations (* = pain)  Thoracic rot R WNL  Thoracic rot L  WNL    Shoulder AROM: standing      R  L  Flexion :  WNL  WNL  Abduction:  WNL  WNL  Functional IR:  WNL  WNL  Functional ER: WNL  WNL    Strength: MMT R  L  Shoulder flexion: 4+/5  4+/5    Shoulder abduction: 4+/5  4+/5    Shoulder ER:  5/5  5/5  Shoulder IR:  5/5  5/5  Elbow flex:  5/5  5/5  Elbow ext:  5/5  5/5        Mechanical Assessment: responding well to central sliders and manual traction at this time       Tenderness/Palpation: Mild TTP in R lower cervical region into R UT and posterior shoulder       ULTT: RUE  Median: slight Pos (reported tightness) at 115 deg   Ulnar: neg   Radial: neg       Shoulder Special Tests:   Impingement:   Parkinson yasemin: neg  Painful arc: neg  Neers: neg  Infraspinatus: neg             Precautions:   Past Medical History:   Diagnosis Date    Asthma     Depression    SOC: 6/13/2024  FOTO: 6/13/2024  POC Expiration: 10/4/2024  Daily Treatment Log:  Date 7/24/2024 7/25/204 7/29/2024     Visit # 6 7 (FOTO)  8 (RE)      Auth         Auth exp        Manual 20'  20'  20'      IASTM to UT  RB  EG EG     C6 central sliders (pressure from R side to L)  20x  20x      Suboccipital release  RB  EG EG     Manual traction   EG EG     There Exer  15 15'     UBE         Door way stretch         Wall slide   5\" x10  5\" x10      B/L shld ER         Levator scap stretch         Kneeling Lat " "stretch at table   10\" x5  10\" x5              Objective measures         Pt edu   EG     HEP  Updated and discussed       There Activ                                                        NMReed 25'   5'      Supine median nerve glide w/ TR for support         Rows         RS in supine cw/ccw        Supine cervical retractions  5\"x10; 2x   R SB 1x10  R SB 1x10      Mechanical assessment  20'        Modalities        Mechanical traction                 HEP:   Access Code: [email or web address]  URL: [email or web address]  Date: 07/25/2024  Prepared by: Archana Hudson    Exercises  - Median Nerve Flossing - Tray  - 1 x daily - 7 x weekly - 2 sets - 5 reps  - Doorway Pec Stretch at 60 Degrees Abduction with Arm Straight  - 1 x daily - 7 x weekly - 2 sets - 5 reps - 5 sec hold  - Gentle Levator Scapulae Stretch  - 1 x daily - 7 x weekly - 1 sets - 5 reps - 5 sec hold  - Supine Passive Cervical Retraction  - 2-3 x daily - 7 x weekly - 1 sets - 10 reps  - Shoulder External Rotation and Scapular Retraction with Resistance  - 1 x daily - 7 x weekly - 1 sets - 10 reps  - Prone Chest Stretch on Chair  - 1 x daily - 7 x weekly - 3 sets - 10 reps  - Seated Cervical Sidebending AROM  - 2-3 x daily - 7 x weekly - 1 sets - 10 reps         "

## 2024-07-31 ENCOUNTER — OFFICE VISIT (OUTPATIENT)
Dept: PHYSICAL THERAPY | Facility: CLINIC | Age: 29
End: 2024-07-31
Payer: COMMERCIAL

## 2024-07-31 DIAGNOSIS — M25.511 CHRONIC RIGHT SHOULDER PAIN: Primary | ICD-10-CM

## 2024-07-31 DIAGNOSIS — M54.2 MYOFASCIAL NECK PAIN: ICD-10-CM

## 2024-07-31 DIAGNOSIS — G89.29 CHRONIC RIGHT SHOULDER PAIN: Primary | ICD-10-CM

## 2024-07-31 DIAGNOSIS — M54.12 CERVICAL RADICULOPATHY: ICD-10-CM

## 2024-07-31 PROCEDURE — 97140 MANUAL THERAPY 1/> REGIONS: CPT | Performed by: PHYSICAL THERAPIST

## 2024-07-31 PROCEDURE — 97110 THERAPEUTIC EXERCISES: CPT | Performed by: PHYSICAL THERAPIST

## 2024-07-31 NOTE — PROGRESS NOTES
"Daily Note     Today's date: 2024  Patient name: Juan Nunn  : 1995  MRN: [id number]  Referring provider: Rm Patel*  Dx:   Encounter Diagnosis     ICD-10-CM    1. Chronic right shoulder pain  M25.511     G89.29       2. Cervical radiculopathy  M54.12       3. Myofascial neck pain  M54.2                      Subjective: Patient reports she has been feeling better since beginning physical therapy and doing the exercises.      Objective: See treatment diary below      Assessment: Tolerated treatment well. Patient demonstrates gradual centralization of symptoms with physical therapy exercises to this point.  Emphasized repeated cervical retraction with sidebending in seated to attempt to progress centralization pattern, though cautioned patient to monitor symptoms and cease if symptoms peripheralize.  She demonstrates continued significant tightness of the sub-occipital muscles along with the levator scapula of the R side.  Patient demonstrated fatigue post treatment, exhibited good technique with therapeutic exercises, and would benefit from continued PT      Plan: Continue per plan of care.  Progress treatment as tolerated.  Progress retraction motions as tolerated with tissue irritability.     Precautions:   Past Medical History:   Diagnosis Date    Asthma     Depression    SOC: 2024  FOTO: 2024  POC Expiration: 10/4/2024  Daily Treatment Log:  Date 2024    Visit # 6 7 (FOTO)  8 (RE)  9    Auth         Auth exp        Manual 20'  20'  20'  20'    IASTM to UT  RB  EG EG BR    C6 central sliders (pressure from R side to L)  20x  20x  BR 20x    Suboccipital release  RB  EG EG BR    Manual traction   EG EG BR    There Exer  15 15' 15'    UBE         Door way stretch         Wall slide   5\" x10  5\" x10      B/L shld ER         Levator scap stretch     5x10\"    Kneeling Lat stretch at table   10\" x5  10\" x5  10\"x5    Seated Upper T/S Ext w/ C/S Ret    " "Rolling chair 15\" x 5    Objective measures         Pt edu   EG     HEP  Updated and discussed       There Activ                                                        NMReed 25'   5'  5'    Supine median nerve glide w/ TR for support         Rows         RS in supine cw/ccw        Supine cervical retractions  5\"x10; 2x   R SB 1x10  R SB 1x10  2x10 R SB seated position    Mechanical assessment  20'        Modalities        Mechanical traction                 HEP:   Access Code: [email or web address]  URL: [email or web address]  Date: 07/25/2024  Prepared by: Archana Hudson    Exercises  - Median Nerve Flossing - Tray  - 1 x daily - 7 x weekly - 2 sets - 5 reps  - Doorway Pec Stretch at 60 Degrees Abduction with Arm Straight  - 1 x daily - 7 x weekly - 2 sets - 5 reps - 5 sec hold  - Gentle Levator Scapulae Stretch  - 1 x daily - 7 x weekly - 1 sets - 5 reps - 5 sec hold  - Supine Passive Cervical Retraction  - 2-3 x daily - 7 x weekly - 1 sets - 10 reps  - Shoulder External Rotation and Scapular Retraction with Resistance  - 1 x daily - 7 x weekly - 1 sets - 10 reps  - Prone Chest Stretch on Chair  - 1 x daily - 7 x weekly - 3 sets - 10 reps  - Seated Cervical Sidebending AROM  - 2-3 x daily - 7 x weekly - 1 sets - 10 reps         "

## 2024-08-01 ENCOUNTER — APPOINTMENT (OUTPATIENT)
Dept: PHYSICAL THERAPY | Facility: CLINIC | Age: 29
End: 2024-08-01
Payer: COMMERCIAL

## 2024-08-05 ENCOUNTER — OFFICE VISIT (OUTPATIENT)
Dept: PHYSICAL THERAPY | Facility: CLINIC | Age: 29
End: 2024-08-05
Payer: COMMERCIAL

## 2024-08-05 DIAGNOSIS — M25.511 CHRONIC RIGHT SHOULDER PAIN: Primary | ICD-10-CM

## 2024-08-05 DIAGNOSIS — M54.2 MYOFASCIAL NECK PAIN: ICD-10-CM

## 2024-08-05 DIAGNOSIS — G89.29 CHRONIC RIGHT SHOULDER PAIN: Primary | ICD-10-CM

## 2024-08-05 DIAGNOSIS — M54.12 CERVICAL RADICULOPATHY: ICD-10-CM

## 2024-08-05 PROCEDURE — 97112 NEUROMUSCULAR REEDUCATION: CPT

## 2024-08-05 PROCEDURE — 97110 THERAPEUTIC EXERCISES: CPT

## 2024-08-05 PROCEDURE — 97140 MANUAL THERAPY 1/> REGIONS: CPT

## 2024-08-05 NOTE — PROGRESS NOTES
"Daily Note     Today's date: 2024  Patient name: Juan Nunn  : 1995  MRN: [id number]  Referring provider: Rm Patel*  Dx:   Encounter Diagnosis     ICD-10-CM    1. Chronic right shoulder pain  M25.511     G89.29       2. Cervical radiculopathy  M54.12       3. Myofascial neck pain  M54.2                      Subjective: Patient reports over the weekend she noticed inc soreness into R posterior neck into her shoulder/shoulder blade area. Reports 5/10 pain R sided neck currently.       Objective: See treatment diary below      Assessment: Tolerated treatment well. Patient demonstrates gradual centralization of symptoms w/ R SB however inc local neck pain noted today was addressed w/ manual and mechanical traction which she responded well to. Edu to inc freq of mechanical correction to help reduce derangement (10x every 2-3hrs or as needed). Patient demonstrated fatigue post treatment, exhibited good technique with therapeutic exercises, and would benefit from continued PT.      Plan: Continue per plan of care.  Progress treatment as tolerated.       Precautions:   Past Medical History:   Diagnosis Date    Asthma     Depression    SOC: 2024  FOTO: 24   POC Expiration: 10/4/2024  Daily Treatment Log:  Date 2024    Visit # 6 7 (FOTO)  8 (RE)  9 10/12    Auth         Auth exp        Manual 20'  20'  20'  20' 15'   IASTM to UT  RB  EG EG BR Lev scap w/ stretching LO   C6 central sliders (pressure from R side to L)  20x  20x  BR 20x    Suboccipital release  RB  EG EG BR    Manual traction   EG EG BR LL Dec/B    There Exer  15 15' 15' 15'   UBE         Door way stretch         Wall slide   5\" x10  5\" x10   5\"x10    B/L shld ER         Levator scap stretch     5x10\" 5x10\"    Kneeling Lat stretch at table   10\" x5  10\" x5  10\"x5    Seated Upper T/S Ext w/ C/S Ret    Rolling chair 15\" x 5    Objective measures         Pt edu   EG     HEP  Updated and " "discussed       There Activ                                                        NMReed 25'   5'  5' 10'   Supine median nerve glide w/ TR for support         Rows         RS in supine cw/ccw        Supine cervical retractions  5\"x10; 2x   R SB 1x10  R SB 1x10  2x10 R SB seated position 2x10 R SB x2 Dec/B    Mechanical assessment  20'        Modalities        Mechanical traction      10# 5'x2            HEP:   Access Code: [email or web address]  URL: [email or web address]  Date: 07/25/2024  Prepared by: Archana Hudson    Exercises  - Median Nerve Flossing - Tray  - 1 x daily - 7 x weekly - 2 sets - 5 reps  - Doorway Pec Stretch at 60 Degrees Abduction with Arm Straight  - 1 x daily - 7 x weekly - 2 sets - 5 reps - 5 sec hold  - Gentle Levator Scapulae Stretch  - 1 x daily - 7 x weekly - 1 sets - 5 reps - 5 sec hold  - Supine Passive Cervical Retraction  - 2-3 x daily - 7 x weekly - 1 sets - 10 reps  - Shoulder External Rotation and Scapular Retraction with Resistance  - 1 x daily - 7 x weekly - 1 sets - 10 reps  - Prone Chest Stretch on Chair  - 1 x daily - 7 x weekly - 3 sets - 10 reps  - Seated Cervical Sidebending AROM  - 2-3 x daily - 7 x weekly - 1 sets - 10 reps         "

## 2024-08-08 ENCOUNTER — OFFICE VISIT (OUTPATIENT)
Dept: PHYSICAL THERAPY | Facility: CLINIC | Age: 29
End: 2024-08-08
Payer: COMMERCIAL

## 2024-08-08 DIAGNOSIS — M54.12 CERVICAL RADICULOPATHY: ICD-10-CM

## 2024-08-08 DIAGNOSIS — M25.511 CHRONIC RIGHT SHOULDER PAIN: Primary | ICD-10-CM

## 2024-08-08 DIAGNOSIS — M54.2 MYOFASCIAL NECK PAIN: ICD-10-CM

## 2024-08-08 DIAGNOSIS — G89.29 CHRONIC RIGHT SHOULDER PAIN: Primary | ICD-10-CM

## 2024-08-08 PROCEDURE — 97140 MANUAL THERAPY 1/> REGIONS: CPT

## 2024-08-08 PROCEDURE — 97012 MECHANICAL TRACTION THERAPY: CPT

## 2024-08-08 NOTE — PROGRESS NOTES
"Daily Note     Today's date: 2024  Patient name: Juan Nunn  : 1995  MRN: [id number]  Referring provider: Rm Patel*  Dx:   Encounter Diagnosis     ICD-10-CM    1. Chronic right shoulder pain  M25.511     G89.29       2. Cervical radiculopathy  M54.12       3. Myofascial neck pain  M54.2                      Subjective: things were going well, I am not feeling great today I'm not sure why I think its the weather. On arrival pain 8/10 R sided neck, today is feeling it down into her R elbow which she has not been feeling       Objective: See treatment diary below  R cerv SB 2x10= NE   [facility] cerv tx= during abolished R UE symptoms; R cerv 6/10; upon release return of R UE symptoms to back of arm   Supine manual unloaded R cerv SB 1x10=NE     Assessment: Tolerated treatment well. Post session dec intensity of UE symptoms but remained to back of arm, dec R cerv pain 5/10. Pt edu to cont with R cev SB, NE today but not worsening. Cont w/ cerv tx for radicular symptoms. Patient would benefit from continued PT      Plan: Continue per plan of care.      Precautions:   Past Medical History:   Diagnosis Date    Asthma     Depression    SOC: 2024  FOTO: 24   POC Expiration: 10/4/2024  Daily Treatment Log:  Date 2024    Visit # 11/12  8 (RE)  9 10/12    Auth         Auth exp        Manual 20'   20'  20' 15'   IASTM to UT  IASTM R cerv/UT RB   EG BR Lev scap w/ stretching LO   Supine cerv R SB  2x5 RB    Shoulder decompression stretch between sets 10\"x3= dec/b to pinchy feeling with manual cerv SB        C6 central sliders (pressure from R side to L)     BR 20x    Suboccipital release    EG BR    Manual traction    EG BR LL Dec/B    There Exer   15' 15' 15'   UBE         Door way stretch         Wall slide    5\" x10   5\"x10    B/L shld ER         Levator scap stretch     5x10\" 5x10\"    Kneeling Lat stretch at table    10\" x5  10\"x5    Seated Upper T/S Ext w/ C/S " "Ret    Rolling chair 15\" x 5    Objective measures         Pt edu   EG     HEP        There Activ                                                        NMReed 5'   5'  5' 10'   Supine median nerve glide w/ TR for support         Rows         RS in supine cw/ccw        Seated R cerv SB  2x10=NE   R SB 1x10  2x10 R SB seated position 2x10 R SB x2 Dec/B    Mechanical assessment         Modalities 15'        Mechanical traction  10# 5'x3    10# 5'x2            HEP:   Access Code: [email or web address]  URL: [email or web address]  Date: 07/25/2024  Prepared by: Archana Hudson    Exercises  - Median Nerve Flossing - Tray  - 1 x daily - 7 x weekly - 2 sets - 5 reps  - Doorway Pec Stretch at 60 Degrees Abduction with Arm Straight  - 1 x daily - 7 x weekly - 2 sets - 5 reps - 5 sec hold  - Gentle Levator Scapulae Stretch  - 1 x daily - 7 x weekly - 1 sets - 5 reps - 5 sec hold  - Supine Passive Cervical Retraction  - 2-3 x daily - 7 x weekly - 1 sets - 10 reps  - Shoulder External Rotation and Scapular Retraction with Resistance  - 1 x daily - 7 x weekly - 1 sets - 10 reps  - Prone Chest Stretch on Chair  - 1 x daily - 7 x weekly - 3 sets - 10 reps  - Seated Cervical Sidebending AROM  - 2-3 x daily - 7 x weekly - 1 sets - 10 reps            "

## 2024-08-12 ENCOUNTER — OFFICE VISIT (OUTPATIENT)
Dept: PHYSICAL THERAPY | Facility: CLINIC | Age: 29
End: 2024-08-12
Payer: COMMERCIAL

## 2024-08-12 DIAGNOSIS — M25.511 CHRONIC RIGHT SHOULDER PAIN: Primary | ICD-10-CM

## 2024-08-12 DIAGNOSIS — M54.2 MYOFASCIAL NECK PAIN: ICD-10-CM

## 2024-08-12 DIAGNOSIS — M54.12 CERVICAL RADICULOPATHY: ICD-10-CM

## 2024-08-12 DIAGNOSIS — G89.29 CHRONIC RIGHT SHOULDER PAIN: Primary | ICD-10-CM

## 2024-08-12 PROCEDURE — 97110 THERAPEUTIC EXERCISES: CPT

## 2024-08-12 PROCEDURE — 97012 MECHANICAL TRACTION THERAPY: CPT

## 2024-08-12 PROCEDURE — 97140 MANUAL THERAPY 1/> REGIONS: CPT

## 2024-08-12 NOTE — PROGRESS NOTES
"Daily Note     Today's date: 2024  Patient name: Juan Nunn  : 1995  MRN: [id number]  Referring provider: Rm Patel*  Dx:   Encounter Diagnosis     ICD-10-CM    1. Chronic right shoulder pain  M25.511     G89.29       2. Cervical radiculopathy  M54.12       3. Myofascial neck pain  M54.2                        Subjective: Patient states she feels better than last session, but she is stressed after work today so it increased a bit from that.       Objective: See treatment diary below      Assessment: Tolerated treatment well with improved tolerance to session today compared to last session. Patient did report some shoulder irritation with trial of levator scap stretch towards R. Will continue to have patient use R side bending to manage symptoms unless progress plateaus or pain levels increase. Patient would benefit from continued PT      Plan: Continue per plan of care.      Precautions:   Past Medical History:   Diagnosis Date    Asthma     Depression    SOC: 2024  FOTO: 24   POC Expiration: 10/4/2024  Daily Treatment Log:  Date 2024    Visit # 11/12 12/24   10/12    Auth         Auth exp        Manual 20'  10'   15'   IASTM to UT  IASTM R cerv/UT RB  IASTM R cerv/UT  EG   Lev scap w/ stretching LO   Supine cerv R SB  2x5 RB    Shoulder decompression stretch between sets 10\"x3= dec/b to pinchy feeling with manual cerv SB        C6 central sliders (pressure from R side to L)         Suboccipital release         Manual traction      LL Dec/B    There Exer  15'   15'   UBE         Door way stretch         Wall slide   5\" x10    5\"x10    B/L shld ER         Levator scap stretch   10\" x5 towards L (irritation in shoulder when trialed towards R)    5x10\"    Kneeling Lat stretch at table   5\" x10       Seated Upper T/S Ext w/ C/S Ret        Objective measures         Pt edu        HEP        There Activ                                                      "   NMReed 5'  5'   10'   Supine median nerve glide w/ TR for support         Rows         RS in supine cw/ccw        Seated R cerv SB  2x10=NE  1x10 = dec/b   2x10 R SB x2 Dec/B    Mechanical assessment         Modalities 15'  10'      Mechanical traction  10# 5'x3 12# 10'    10# 5'x2            HEP:   Access Code: [email or web address]  URL: [email or web address]  Date: 07/25/2024  Prepared by: Archana Hudson    Exercises  - Median Nerve Flossing - Tray  - 1 x daily - 7 x weekly - 2 sets - 5 reps  - Doorway Pec Stretch at 60 Degrees Abduction with Arm Straight  - 1 x daily - 7 x weekly - 2 sets - 5 reps - 5 sec hold  - Gentle Levator Scapulae Stretch  - 1 x daily - 7 x weekly - 1 sets - 5 reps - 5 sec hold  - Supine Passive Cervical Retraction  - 2-3 x daily - 7 x weekly - 1 sets - 10 reps  - Shoulder External Rotation and Scapular Retraction with Resistance  - 1 x daily - 7 x weekly - 1 sets - 10 reps  - Prone Chest Stretch on Chair  - 1 x daily - 7 x weekly - 3 sets - 10 reps  - Seated Cervical Sidebending AROM  - 2-3 x daily - 7 x weekly - 1 sets - 10 reps

## 2024-08-15 ENCOUNTER — APPOINTMENT (OUTPATIENT)
Dept: PHYSICAL THERAPY | Facility: CLINIC | Age: 29
End: 2024-08-15
Payer: COMMERCIAL

## 2024-08-15 ENCOUNTER — TELEPHONE (OUTPATIENT)
Dept: PHYSICAL THERAPY | Facility: CLINIC | Age: 29
End: 2024-08-15

## 2024-08-19 ENCOUNTER — OFFICE VISIT (OUTPATIENT)
Dept: PHYSICAL THERAPY | Facility: CLINIC | Age: 29
End: 2024-08-19
Payer: COMMERCIAL

## 2024-08-19 DIAGNOSIS — M54.2 MYOFASCIAL NECK PAIN: ICD-10-CM

## 2024-08-19 DIAGNOSIS — M25.511 CHRONIC RIGHT SHOULDER PAIN: Primary | ICD-10-CM

## 2024-08-19 DIAGNOSIS — M54.12 CERVICAL RADICULOPATHY: ICD-10-CM

## 2024-08-19 DIAGNOSIS — G89.29 CHRONIC RIGHT SHOULDER PAIN: Primary | ICD-10-CM

## 2024-08-19 PROCEDURE — 97110 THERAPEUTIC EXERCISES: CPT

## 2024-08-19 PROCEDURE — 97012 MECHANICAL TRACTION THERAPY: CPT

## 2024-08-19 PROCEDURE — 97140 MANUAL THERAPY 1/> REGIONS: CPT

## 2024-08-19 NOTE — PROGRESS NOTES
"Daily Note     Today's date: 2024  Patient name: Juan Nunn  : 1995  MRN: [id number]  Referring provider: Rm Patel*  Dx:   Encounter Diagnosis     ICD-10-CM    1. Chronic right shoulder pain  M25.511     G89.29       2. Cervical radiculopathy  M54.12       3. Myofascial neck pain  M54.2                          Subjective: Patient states she feels better than last session, but her neck is a bit irritated today.       Objective: See treatment diary below      Assessment: Tolerated treatment well with no change to pain levels noted at end of session. Will continue to have patient use R side bending with HEP but will reassess at next session should patient feel this is no longer helping symptoms. Patient would benefit from continued PT      Plan: Continue per plan of care.      Precautions:   Past Medical History:   Diagnosis Date    Asthma     Depression    SOC: 2024  FOTO: 24   POC Expiration: 10/4/2024  Daily Treatment Log:  Date 2024     Visit #      Auth         Auth exp        Manual 20'  10' 15'     IASTM to UT  IASTM R cerv/UT RB  IASTM R cerv/UT  EG IASTM R cerv/UT  EG     Supine cerv R SB  2x5 RB    Shoulder decompression stretch between sets 10\"x3= dec/b to pinchy feeling with manual cerv SB        C6 central sliders (pressure from R side to L)         Suboccipital release         Manual traction         There Exer  15' 10'     UBE         Door way stretch         Wall slide   5\" x10  5\" x10      B/L shld ER         Levator scap stretch   10\" x5 towards L (irritation in shoulder when trialed towards R)       Kneeling Lat stretch at table   5\" x10  5\" x10      Seated Upper T/S Ext w/ C/S Ret        Objective measures         Pt edu        HEP        There Activ                                                        NMReed 5'  5' 5'     Supine median nerve glide w/ TR for support         Rows         RS in supine cw/ccw      "   Seated R cerv SB  2x10=NE  1x10 = dec/b 1x10      Mechanical assessment         Modalities 15'  10' 10'     Mechanical traction  10# 5'x3 12# 10'  14# 10'              HEP:   Access Code: [email or web address]  URL: [email or web address]  Date: 07/25/2024  Prepared by: Archana Hudson    Exercises  - Median Nerve Flossing - Tray  - 1 x daily - 7 x weekly - 2 sets - 5 reps  - Doorway Pec Stretch at 60 Degrees Abduction with Arm Straight  - 1 x daily - 7 x weekly - 2 sets - 5 reps - 5 sec hold  - Gentle Levator Scapulae Stretch  - 1 x daily - 7 x weekly - 1 sets - 5 reps - 5 sec hold  - Supine Passive Cervical Retraction  - 2-3 x daily - 7 x weekly - 1 sets - 10 reps  - Shoulder External Rotation and Scapular Retraction with Resistance  - 1 x daily - 7 x weekly - 1 sets - 10 reps  - Prone Chest Stretch on Chair  - 1 x daily - 7 x weekly - 3 sets - 10 reps  - Seated Cervical Sidebending AROM  - 2-3 x daily - 7 x weekly - 1 sets - 10 reps

## 2024-08-22 ENCOUNTER — OFFICE VISIT (OUTPATIENT)
Dept: PHYSICAL THERAPY | Facility: CLINIC | Age: 29
End: 2024-08-22
Payer: COMMERCIAL

## 2024-08-22 DIAGNOSIS — M54.12 CERVICAL RADICULOPATHY: ICD-10-CM

## 2024-08-22 DIAGNOSIS — M54.2 MYOFASCIAL NECK PAIN: ICD-10-CM

## 2024-08-22 DIAGNOSIS — G89.29 CHRONIC RIGHT SHOULDER PAIN: Primary | ICD-10-CM

## 2024-08-22 DIAGNOSIS — M25.511 CHRONIC RIGHT SHOULDER PAIN: Primary | ICD-10-CM

## 2024-08-22 LAB
ALBUMIN SERPL-MCNC: 4.7 G/DL (ref 3.9–4.9)
ALP SERPL-CCNC: 64 IU/L (ref 44–121)
ALPHA-GAL IGE QN: <0.1 KU/L
ALT SERPL-CCNC: 15 IU/L (ref 0–32)
AST SERPL-CCNC: 19 IU/L (ref 0–40)
BEEF IGE QN: <0.1 KU/L
BILIRUB SERPL-MCNC: 0.5 MG/DL (ref 0–1.2)
BUN SERPL-MCNC: 11 MG/DL (ref 6–20)
BUN/CREAT SERPL: 13 (ref 9–23)
CALCIUM SERPL-MCNC: 9.7 MG/DL (ref 8.7–10.2)
CHLORIDE SERPL-SCNC: 101 MMOL/L (ref 96–106)
CHOLEST SERPL-MCNC: 182 MG/DL (ref 100–199)
CO2 SERPL-SCNC: 22 MMOL/L (ref 20–29)
CREAT SERPL-MCNC: 0.87 MG/DL (ref 0.57–1)
EGFR: 91 ML/MIN/1.73
ERYTHROCYTE [DISTWIDTH] IN BLOOD BY AUTOMATED COUNT: 11.7 % (ref 11.7–15.4)
GLOBULIN SER-MCNC: 2.5 G/DL (ref 1.5–4.5)
GLUCOSE SERPL-MCNC: 94 MG/DL (ref 70–99)
HCT VFR BLD AUTO: 40 % (ref 34–46.6)
HCV AB S/CO SERPL IA: NON REACTIVE
HDLC SERPL-MCNC: 63 MG/DL
HGB BLD-MCNC: 13.3 G/DL (ref 11.1–15.9)
IGE SERPL-ACNC: 14 IU/ML (ref 6–495)
LAMB IGE QN: <0.1 KU/L
LDLC SERPL CALC-MCNC: 108 MG/DL (ref 0–99)
LDLC/HDLC SERPL: 1.7 RATIO (ref 0–3.2)
Lab: NORMAL
MCH RBC QN AUTO: 30.9 PG (ref 26.6–33)
MCHC RBC AUTO-ENTMCNC: 33.3 G/DL (ref 31.5–35.7)
MCV RBC AUTO: 93 FL (ref 79–97)
MICRODELETION SYND BLD/T FISH: NORMAL
PLATELET # BLD AUTO: 245 X10E3/UL (ref 150–450)
PORK IGE QN: <0.1 KU/L
POTASSIUM SERPL-SCNC: 4.6 MMOL/L (ref 3.5–5.2)
PROT SERPL-MCNC: 7.2 G/DL (ref 6–8.5)
RBC # BLD AUTO: 4.3 X10E6/UL (ref 3.77–5.28)
RETICULIN IGA TITR SER IF: NEGATIVE TITER
SL AMB VLDL CHOLESTEROL CALC: 11 MG/DL (ref 5–40)
SODIUM SERPL-SCNC: 138 MMOL/L (ref 134–144)
TRIGL SERPL-MCNC: 57 MG/DL (ref 0–149)
TSH SERPL DL<=0.005 MIU/L-ACNC: 0.63 UIU/ML (ref 0.45–4.5)
WBC # BLD AUTO: 5.6 X10E3/UL (ref 3.4–10.8)

## 2024-08-22 PROCEDURE — 97110 THERAPEUTIC EXERCISES: CPT

## 2024-08-22 PROCEDURE — 97112 NEUROMUSCULAR REEDUCATION: CPT

## 2024-08-22 NOTE — PROGRESS NOTES
"Daily Note     Today's date: 2024  Patient name: Juan Nunn  : 1995  MRN: [id number]  Referring provider: Rm Patel*  Dx:   Encounter Diagnosis     ICD-10-CM    1. Chronic right shoulder pain  M25.511     G89.29       2. Cervical radiculopathy  M54.12       3. Myofascial neck pain  M54.2                      Subjective: Pt reports that recently the last couple days she feels things are getting a little better. She cont with intermittent R UE symptoms mostly when driving to approx the elbow, she has not gotten any of the hand paresthesia. On arrival to session pain mostly in her R shoulder and shoulder blade 4/10, she has been cont with the R cerv SB at home.       Objective: See treatment diary below      Assessment: Tolerated treatment well. Pt able to tolerate additional postural strengthening today w/o increased symptoms. Pt to trial additional modifications to R cerv SB to assess response, mild relief during session in pain intensity. Pt advised to f/u w/ MD, prior to today relief has plateau. Patient would benefit from continued PT      Plan: Continue per plan of care.      Precautions:   Past Medical History:   Diagnosis Date    Asthma     Depression    SOC: 2024  FOTO: 24   POC Expiration: 10/4/2024  Daily Treatment Log:  Date 2024    Visit #     Auth         Auth exp        Manual 20'  10' 15'     IASTM to UT  IASTM R cerv/UT RB  IASTM R cerv/UT  EG IASTM R cerv/UT  EG Deferred     Supine cerv R SB  2x5 RB    Shoulder decompression stretch between sets 10\"x3= dec/b to pinchy feeling with manual cerv SB        C6 central sliders (pressure from R side to L)         Suboccipital release         Manual traction         There Exer  15' 10' 13'     UBE     3'     Door way stretch         Wall slide   5\" x10  5\" x10  5\"x10     Supine on pball HHA UE flex for thoracic ext     5\"x10     B/L shld ER     5\"x10     Levator " "scap stretch   10\" x5 towards L (irritation in shoulder when trialed towards R)       Kneeling Lat stretch at table   5\" x10  5\" x10  5\"x10    Seated Upper T/S Ext w/ C/S Ret        Objective measures         Pt edu        HEP        There Activ                                                        NMReed 5'  5' 5' 25'      Supine median nerve glide w/ TR for support         Rows     Red tubing 1x10     B/L shoulder ext     Red tubing 1x10     RS in supine cw/ccw        Seated R cerv SB  2x10=NE  1x10 = dec/b 1x10  W/ self strap OP 5\"x10 = dec/b?    W/ R UE OP 5\"x10     Mechanical assessment         Modalities 15'  10' 10'     Mechanical traction  10# 5'x3 12# 10'  14# 10'  Deferred             HEP:   Access Code: [email or web address]  URL: [email or web address]  Date: 07/25/2024  Prepared by: Archana Hudson    Exercises  - Median Nerve Flossing - Tray  - 1 x daily - 7 x weekly - 2 sets - 5 reps  - Doorway Pec Stretch at 60 Degrees Abduction with Arm Straight  - 1 x daily - 7 x weekly - 2 sets - 5 reps - 5 sec hold  - Gentle Levator Scapulae Stretch  - 1 x daily - 7 x weekly - 1 sets - 5 reps - 5 sec hold  - Supine Passive Cervical Retraction  - 2-3 x daily - 7 x weekly - 1 sets - 10 reps  - Shoulder External Rotation and Scapular Retraction with Resistance  - 1 x daily - 7 x weekly - 1 sets - 10 reps  - Prone Chest Stretch on Chair  - 1 x daily - 7 x weekly - 3 sets - 10 reps  - Seated Cervical Sidebending AROM  - 2-3 x daily - 7 x weekly - 1 sets - 10 reps             "

## 2024-08-26 ENCOUNTER — OFFICE VISIT (OUTPATIENT)
Dept: PHYSICAL THERAPY | Facility: CLINIC | Age: 29
End: 2024-08-26
Payer: COMMERCIAL

## 2024-08-26 DIAGNOSIS — M54.2 MYOFASCIAL NECK PAIN: ICD-10-CM

## 2024-08-26 DIAGNOSIS — M54.12 CERVICAL RADICULOPATHY: ICD-10-CM

## 2024-08-26 DIAGNOSIS — G89.29 CHRONIC RIGHT SHOULDER PAIN: Primary | ICD-10-CM

## 2024-08-26 DIAGNOSIS — M25.511 CHRONIC RIGHT SHOULDER PAIN: Primary | ICD-10-CM

## 2024-08-26 PROCEDURE — 97112 NEUROMUSCULAR REEDUCATION: CPT

## 2024-08-26 PROCEDURE — 97110 THERAPEUTIC EXERCISES: CPT

## 2024-08-26 NOTE — PROGRESS NOTES
"Daily Note     Today's date: 2024  Patient name: Juan Nunn  : 1995  MRN: [id number]  Referring provider: Rm Patel*  Dx:   Encounter Diagnosis     ICD-10-CM    1. Chronic right shoulder pain  M25.511     G89.29       2. Cervical radiculopathy  M54.12       3. Myofascial neck pain  M54.2                        Subjective: Pt reports that the neck, shoulder, and distal UE symptoms are better today but she still has some pinching in her shoulder blade. Patient reports last week when it was better she was walking and standing more as well as using her UE more. She noted a slight increase in scapula pain with return to work. Discussed sitting posture and use of lumbar roll.       Objective: See treatment diary below      Assessment: Tolerated treatment well. Patient to trial use of lumbar roll for postural cue and support at work as well as increased use of R side bending with OP. Patient to f/u w/ MD, prior to recent visits relief has plateaued. Patient would benefit from continued PT      Plan: Continue per plan of care.      Precautions:   Past Medical History:   Diagnosis Date    Asthma     Depression    SOC: 2024  FOTO: 24   POC Expiration: 10/4/2024  Daily Treatment Log:  Date 2024   Visit # 11/12 12/24 13/24 14/24 15/24   Auth         Auth exp        Manual 20'  10' 15'     IASTM to UT  IASTM R cerv/UT RB  IASTM R cerv/UT  EG IASTM R cerv/UT  EG Deferred  Deferred    Supine cerv R SB  2x5 RB    Shoulder decompression stretch between sets 10\"x3= dec/b to pinchy feeling with manual cerv SB        C6 central sliders (pressure from R side to L)         Suboccipital release         Manual traction         There Exer  15' 10' 13'  18'   UBE     3'  3'    Door way stretch      5\" x5 low    Wall slide   5\" x10  5\" x10  5\"x10  5\" x10    Supine on pball HHA UE flex for thoracic ext     5\"x10  5\" x10 w/ 2# MB for inc stretch     B/L shld ER    " " 5\"x10  5\" x10    Levator scap stretch   10\" x5 towards L (irritation in shoulder when trialed towards R)       Kneeling Lat stretch at table   5\" x10  5\" x10  5\"x10 5\" x10    Seated Upper T/S Ext w/ C/S Ret        Objective measures         Pt edu     Discussed sitting posture (use of lumbar roll) at work and ergonomic adjustments    HEP        There Activ                                                        NMReed 5'  5' 5' 25'   20'   Supine median nerve glide w/ TR for support         Rows     Red tubing 1x10  Red tubing 1x10    B/L shoulder ext     Red tubing 1x10  Red tubing 1x10    RS in supine cw/ccw        Seated R cerv SB  2x10=NE  1x10 = dec/b 1x10  W/ self strap OP 5\"x10 = dec/b?    W/ R UE OP 5\"x10  W/ self strap OP 5\"x10   Mechanical assessment         Modalities 15'  10' 10'     Mechanical traction  10# 5'x3 12# 10'  14# 10'  Deferred             HEP:   Access Code: [email or web address]  URL: [email or web address]  Date: 08/26/2024  Prepared by: Archana Hudson    Exercises  - Median Nerve Flossing - Tray  - 1 x daily - 7 x weekly - 2 sets - 5 reps  - Doorway Pec Stretch at 60 Degrees Abduction with Arm Straight  - 1 x daily - 7 x weekly - 2 sets - 5 reps - 5 sec hold  - Supine Passive Cervical Retraction  - 1-2 x daily - 7 x weekly - 1 sets - 10 reps  - Shoulder External Rotation and Scapular Retraction with Resistance  - 1 x daily - 7 x weekly - 1 sets - 10 reps  - Prone Chest Stretch on Chair  - 1 x daily - 7 x weekly - 3 sets - 10 reps  - Seated Cervical Sidebending Stretch  - 2-3 x daily - 7 x weekly - 3 sets - 10 reps         "

## 2024-08-29 ENCOUNTER — APPOINTMENT (OUTPATIENT)
Dept: PHYSICAL THERAPY | Facility: CLINIC | Age: 29
End: 2024-08-29
Payer: COMMERCIAL

## 2024-08-29 ENCOUNTER — TELEPHONE (OUTPATIENT)
Dept: PHYSICAL THERAPY | Facility: CLINIC | Age: 29
End: 2024-08-29

## 2024-08-29 NOTE — TELEPHONE ENCOUNTER
patient called [facility]- not feeling well - cx'd 8/29 - wants to see her doctor prior to scheduling more appts.    Archana Hudson PT, DPT   License #  [id number]

## 2024-09-19 ENCOUNTER — OFFICE VISIT (OUTPATIENT)
Dept: OBGYN CLINIC | Facility: CLINIC | Age: 29
End: 2024-09-19
Payer: COMMERCIAL

## 2024-09-19 VITALS
DIASTOLIC BLOOD PRESSURE: 67 MMHG | WEIGHT: 180 LBS | SYSTOLIC BLOOD PRESSURE: 98 MMHG | HEART RATE: 68 BPM | HEIGHT: 69 IN | BODY MASS INDEX: 26.66 KG/M2

## 2024-09-19 DIAGNOSIS — G89.29 CHRONIC RIGHT SHOULDER PAIN: ICD-10-CM

## 2024-09-19 DIAGNOSIS — M25.511 CHRONIC RIGHT SHOULDER PAIN: ICD-10-CM

## 2024-09-19 DIAGNOSIS — M54.12 CERVICAL RADICULOPATHY: ICD-10-CM

## 2024-09-19 DIAGNOSIS — M54.2 MYOFASCIAL NECK PAIN: Primary | ICD-10-CM

## 2024-09-19 PROCEDURE — 99213 OFFICE O/P EST LOW 20 MIN: CPT | Performed by: ORTHOPAEDIC SURGERY
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
## 2024-11-15 ENCOUNTER — OFFICE VISIT (OUTPATIENT)
Dept: URGENT CARE | Facility: CLINIC | Age: 29
End: 2024-11-15
Payer: COMMERCIAL

## 2024-11-15 VITALS
OXYGEN SATURATION: 98 % | HEART RATE: 70 BPM | DIASTOLIC BLOOD PRESSURE: 88 MMHG | RESPIRATION RATE: 16 BRPM | WEIGHT: 186 LBS | TEMPERATURE: 98.5 F | BODY MASS INDEX: 27.47 KG/M2 | SYSTOLIC BLOOD PRESSURE: 126 MMHG

## 2024-11-15 DIAGNOSIS — J06.9 VIRAL URI WITH COUGH: Primary | ICD-10-CM

## 2024-11-15 PROCEDURE — 99213 OFFICE O/P EST LOW 20 MIN: CPT | Performed by: FAMILY MEDICINE

## 2024-11-15 RX ORDER — GABAPENTIN 100 MG/1
100 CAPSULE ORAL 3 TIMES DAILY
COMMUNITY
Start: 2024-10-07

## 2024-11-15 RX ORDER — FLUTICASONE PROPIONATE 50 MCG
1 SPRAY, SUSPENSION (ML) NASAL 2 TIMES DAILY
Qty: 16 G | Refills: 0 | Status: SHIPPED | OUTPATIENT
Start: 2024-11-15 | End: 2024-11-18

## 2024-11-15 RX ORDER — BENZONATATE 200 MG/1
200 CAPSULE ORAL 3 TIMES DAILY PRN
Qty: 20 CAPSULE | Refills: 0 | Status: SHIPPED | OUTPATIENT
Start: 2024-11-15

## 2024-11-15 NOTE — PROGRESS NOTES
[facility]        NAME: Juan Nunn is a 29 y.o. female  : 1995    MRN: [id number]  DATE: November 15, 2024  TIME: 2:46 PM    Assessment and Plan   Viral URI with cough [J06.9]  1. Viral URI with cough  fluticasone (FLONASE) 50 mcg/act nasal spray    benzonatate (TESSALON) 200 MG capsule        Right otalgia likely from eustachian tube dysfunction secondary to a viral rhinitis with postnasal drip.  Will treat with Flonase and Tessalon Perles as needed.  Patient advised on gargling with warm salt water twice daily and to hydrate plenty fluids.    Patient Instructions     Follow up with PCP in 3-5 days.  Proceed to  ER if symptoms worsen.    If tests have been performed at [facility], our office will contact you with results if changes need to be made to the care plan discussed with you at the visit.  [facility]    Chief Complaint     Chief Complaint   Patient presents with    URI     Pt c/o sinus congestion, cough, sore throat that started last Friday.         History of Present Illness       29-year-old female presents today with about 1 week of URI symptoms including nasal congestion, rhinorrhea, postnasal drip and sore throat.  Has also been experiencing a cough which appears to be worse in the mornings.  Denies any obvious fevers, chills, chest pain, dyspnea, abdominal symptoms or dizziness.  In addition, has been experiencing headaches and some mild myalgias of the legs.  Denies any obvious sick contacts.    URI   Associated symptoms include congestion, coughing, ear pain (right), headaches, rhinorrhea and a sore throat. Pertinent negatives include no abdominal pain, chest pain, diarrhea or nausea.       Review of Systems   Review of Systems   Constitutional:  Negative for chills, fatigue and fever.   HENT:  Positive for congestion, ear pain (right), postnasal drip, rhinorrhea, sinus pressure and sore throat.    Respiratory:  Positive for cough.  Negative for chest tightness and shortness of breath.    Cardiovascular:  Negative for chest pain.   Gastrointestinal:  Negative for abdominal pain, diarrhea and nausea.   Musculoskeletal:  Positive for myalgias.   Neurological:  Positive for headaches. Negative for dizziness.     Current Medications       Current Outpatient Medications:     benzonatate (TESSALON) 200 MG capsule, Take 1 capsule (200 mg total) by mouth 3 (three) times a day as needed for cough, Disp: 20 capsule, Rfl: 0    escitalopram (LEXAPRO) 10 mg tablet, take 1 tablet by mouth once daily, Disp: 90 tablet, Rfl: 1    fluticasone (FLONASE) 50 mcg/act nasal spray, 1 spray into each nostril 2 (two) times a day for 3 days, Disp: 16 g, Rfl: 0    gabapentin (NEURONTIN) 100 mg capsule, Take 100 mg by mouth 3 (three) times a day, Disp: , Rfl:     Norethindrone (KATHERINE PO), Take by mouth Birth control; pt is unsure of mg, Disp: , Rfl:     Current Allergies     Allergies as of 11/15/2024 - Reviewed 11/15/2024   Allergen Reaction Noted    Penicillins Rash 07/01/2005            The following portions of the patient's history were reviewed and updated as appropriate: allergies, current medications, past family history, past medical history, past social history, past surgical history and problem list.     Past Medical History:   Diagnosis Date    Asthma     Depression        Past Surgical History:   Procedure Laterality Date    APPENDECTOMY         Family History   Problem Relation Age of Onset    Anxiety disorder Mother     Hypertension Mother     Diabetes Father     Hypertension Father     Breast cancer Maternal Grandmother     Ovarian cancer Maternal Grandmother     Colon cancer Maternal Grandfather          Medications have been verified.        Objective   /88   Pulse 70   Temp 98.5 °F (36.9 °C) (Oral)   Resp 16   Wt 84.4 kg (186 lb)   SpO2 98%   BMI 27.47 kg/m²   No LMP recorded.       Physical Exam     Physical Exam  Vitals and nursing note  reviewed.   Constitutional:       General: She is in acute distress.      Appearance: Normal appearance. She is not ill-appearing or toxic-appearing.   HENT:      Head: Normocephalic and atraumatic.      Right Ear: Tympanic membrane, ear canal and external ear normal. There is no impacted cerumen.      Left Ear: Tympanic membrane, ear canal and external ear normal. There is no impacted cerumen.      Nose: Congestion and rhinorrhea present.      Comments: Inflamed nasal mucosa     Mouth/Throat:      Mouth: Mucous membranes are moist.      Pharynx: No posterior oropharyngeal erythema.   Eyes:      General:         Right eye: No discharge.         Left eye: No discharge.      Conjunctiva/sclera: Conjunctivae normal.   Cardiovascular:      Rate and Rhythm: Normal rate and regular rhythm.   Pulmonary:      Effort: Pulmonary effort is normal. No respiratory distress.      Breath sounds: Normal breath sounds. No wheezing, rhonchi or rales.   Skin:     General: Skin is warm.      Findings: No erythema.   Neurological:      General: No focal deficit present.      Mental Status: She is alert and oriented to person, place, and time.   Psychiatric:         Mood and Affect: Mood normal.         Behavior: Behavior normal.         Thought Content: Thought content normal.         Judgment: Judgment normal.

## 2024-12-16 DIAGNOSIS — F41.1 GAD (GENERALIZED ANXIETY DISORDER): ICD-10-CM

## 2024-12-16 DIAGNOSIS — F32.A MILD DEPRESSION: ICD-10-CM

## 2024-12-17 RX ORDER — ESCITALOPRAM OXALATE 10 MG/1
10 TABLET ORAL DAILY
Qty: 90 TABLET | Refills: 1 | Status: SHIPPED | OUTPATIENT
Start: 2024-12-17